# Patient Record
Sex: FEMALE | Race: WHITE | Employment: UNEMPLOYED | URBAN - METROPOLITAN AREA
[De-identification: names, ages, dates, MRNs, and addresses within clinical notes are randomized per-mention and may not be internally consistent; named-entity substitution may affect disease eponyms.]

---

## 2018-01-10 ENCOUNTER — TELEPHONE (OUTPATIENT)
Dept: ENDOCRINOLOGY | Age: 63
End: 2018-01-10

## 2018-01-22 ENCOUNTER — OFFICE VISIT (OUTPATIENT)
Dept: ENDOCRINOLOGY | Age: 63
End: 2018-01-22

## 2018-01-22 VITALS
SYSTOLIC BLOOD PRESSURE: 120 MMHG | HEART RATE: 66 BPM | HEIGHT: 63 IN | WEIGHT: 252 LBS | DIASTOLIC BLOOD PRESSURE: 45 MMHG | BODY MASS INDEX: 44.65 KG/M2

## 2018-01-22 DIAGNOSIS — Z79.4 TYPE 2 DIABETES MELLITUS WITHOUT COMPLICATION, WITH LONG-TERM CURRENT USE OF INSULIN (HCC): Primary | ICD-10-CM

## 2018-01-22 DIAGNOSIS — E11.9 TYPE 2 DIABETES MELLITUS WITHOUT COMPLICATION, WITH LONG-TERM CURRENT USE OF INSULIN (HCC): Primary | ICD-10-CM

## 2018-01-22 DIAGNOSIS — E78.2 MIXED HYPERLIPIDEMIA: ICD-10-CM

## 2018-01-22 DIAGNOSIS — I10 ESSENTIAL HYPERTENSION: ICD-10-CM

## 2018-01-22 PROCEDURE — 99215 OFFICE O/P EST HI 40 MIN: CPT | Performed by: INTERNAL MEDICINE

## 2018-01-22 RX ORDER — TORSEMIDE 20 MG/1
200 TABLET ORAL
COMMUNITY
End: 2020-11-18

## 2018-01-22 RX ORDER — HYDROCODONE BITARTRATE AND ACETAMINOPHEN 5; 325 MG/1; MG/1
5-250 TABLET ORAL EVERY 6 HOURS PRN
COMMUNITY

## 2018-01-22 RX ORDER — CITALOPRAM 20 MG/1
40 TABLET ORAL DAILY
COMMUNITY

## 2018-01-22 RX ORDER — PRAVASTATIN SODIUM 80 MG/1
80 TABLET ORAL
COMMUNITY
Start: 2017-12-19

## 2018-01-22 RX ORDER — ALPRAZOLAM 0.5 MG/1
0.5 TABLET ORAL NIGHTLY PRN
COMMUNITY
End: 2019-08-06

## 2018-01-22 RX ORDER — CLOPIDOGREL BISULFATE 75 MG/1
75 TABLET ORAL DAILY
COMMUNITY
End: 2018-05-07

## 2018-01-22 RX ORDER — GEMFIBROZIL 600 MG/1
600 TABLET, FILM COATED ORAL
COMMUNITY
End: 2018-01-29 | Stop reason: SDUPTHER

## 2018-01-22 RX ORDER — LISINOPRIL 20 MG/1
20 TABLET ORAL DAILY
COMMUNITY
End: 2019-11-19

## 2018-01-22 ASSESSMENT — PATIENT HEALTH QUESTIONNAIRE - PHQ9
SUM OF ALL RESPONSES TO PHQ9 QUESTIONS 1 & 2: 0
SUM OF ALL RESPONSES TO PHQ QUESTIONS 1-9: 0
2. FEELING DOWN, DEPRESSED OR HOPELESS: 0
1. LITTLE INTEREST OR PLEASURE IN DOING THINGS: 0

## 2018-01-22 NOTE — PROGRESS NOTES
5/3/2010    Type II or unspecified type diabetes mellitus without mention of complication, not stated as uncontrolled 5/3/2010    Unspecified essential hypertension 5/3/2010    Unspecified vitamin D deficiency 5/3/2010      Patient Active Problem List   Diagnosis    Vitamin D deficiency    Essential hypertension    Other and unspecified hyperlipidemia    Mononeuritis    Esophageal reflux    Depressive disorder, not elsewhere classified    Benign intracranial hypertension    Fibromyalgia    Diabetes mellitus out of control ([facility] [address]    Proteinuria    Hypercalcemia    Arthritis     Past Surgical History:   Procedure Laterality Date    GYNECOLOGIC CRYOSURGERY      D&C    HYSTERECTOMY, TOTAL ABDOMINAL      SHOULDER SURGERY      WRIST SURGERY       Social History     Social History    Marital status: Single     Spouse name: N/A    Number of children: N/A    Years of education: N/A     Occupational History    Not on file. Social History Main Topics    Smoking status: Never Smoker    Smokeless tobacco: Never Used    Alcohol use No    Drug use: No    Sexual activity: Not on file     Other Topics Concern    Not on file     Social History Narrative    No narrative on file     Family History   Problem Relation Age of Onset   [facility] Cancer Mother     Diabetes Mother     High Blood Pressure Mother     Cancer Father     Diabetes Father     High Blood Pressure Father      Current Outpatient Prescriptions   Medication Sig Dispense Refill    aspirin 81 MG tablet Take 81 mg by mouth      insulin aspart (NOVOLOG) 100 UNIT/ML injection vial Inject 60 Units into the skin       pravastatin (PRAVACHOL) 80 MG tablet Take 80 mg by mouth      lisinopril (PRINIVIL;ZESTRIL) 20 MG tablet Take 20 mg by mouth daily      HYDROcodone-acetaminophen (NORCO) 5-325 MG per tablet Take 5-250 tablets by mouth every 6 hours as needed for Pain.       clopidogrel (PLAVIX) 75 MG tablet Take 75 mg by mouth daily      gemfibrozil (LOPID) 600 MG tablet Take 600 mg by mouth 2 times daily (before meals)      ALPRAZolam (XANAX) 0.5 MG tablet Take 0.5 mg by mouth nightly as needed for Sleep.  citalopram (CELEXA) 20 MG tablet Take 20 mg by mouth daily      insulin detemir (LEVEMIR) 100 UNIT/ML injection vial Inject 95 Units into the skin 2 times daily      torsemide (DEMADEX) 20 MG tablet Take 20-80 mg by mouth daily      Erythromycin 2 % OINT Apply topically 3 times daily      gabapentin (NEURONTIN) 600 MG tablet Take 600 mg by mouth 3 times daily.  pregabalin (LYRICA) 200 MG capsule Take 200 mg by mouth. One tid      Insulin Syringes, Disposable, U-100 0.5 ML MISC by Does not apply route. Use bid or as directed       Blood Glucose Monitoring Suppl (ONE TOUCH TEST STRIP RAGLAND) by Does not apply route. Tests six times a day       zolpidem (AMBIEN) 5 MG tablet Take 5 mg by mouth nightly as needed.  LamoTRIgine (LAMICTAL XR) 200 MG TB24 Take  by mouth. No current facility-administered medications for this visit.       Allergies   Allergen Reactions    Lipitor     Sumatriptan      Family Status   Relation Status    Mother Alive   Kameron Mac Father     Sister Alive       Review of Systems  Constitutional: no fatigue, no fever, no recent weight gain, no recent weight loss, no changes in appetite  Eyes: no eye pain, no change in vision, no eye redness, no eye irritation, no double vision  Ears, nose, throat: no nasal congestion, no sore throat, no earache, no decrease in hearing, no hoarseness, no dry mouth, no sinus problems, no difficulty swallowing, no neck lumps, no dental problems, no mouth sores, no ringing in ears  Pulmonary: no shortness of breath, no wheezing, no dyspnea on exertion, no cough  Cardiovascular: no chest pain, no lower extremity edema, no orthopnea, no intermittent leg claudication, no palpitations  Gastrointestinal: no abdominal pain, no nausea, no vomiting, no diarrhea, no

## 2018-01-22 NOTE — PATIENT INSTRUCTIONS
carbohydrate serving. Count carbs  Counting carbs lets you know how much rapid-acting insulin to take before you eat. If you use an insulin pump, you get a constant rate of insulin during the day. So the pump must be programmed at meals. This gives you extra insulin to cover the rise in blood sugar after meals. If you take insulin:  · Learn your own insulin-to-carb ratio. You and your diabetes health professional will figure out the ratio. You can do this by testing your blood sugar after meals. For example, you may need a certain amount of insulin for every 15 grams of carbs. · Add up the carb grams in a meal. Then you can figure out how many units of insulin to take based on your insulin-to-carb ratio. · Exercise lowers blood sugar. You can use less insulin than you would if you were not doing exercise. Keep in mind that timing matters. If you exercise within 1 hour after a meal, your body may need less insulin for that meal than it would if you exercised 3 hours after the meal. Test your blood sugar to find out how exercise affects your need for insulin. If you do or don't take insulin:  · Look at labels on packaged foods. This can tell you how many carbs are in a serving. You can also use guides from the American Diabetes Association. · Be aware of portions, or serving sizes. If a package has two servings and you eat the whole package, you need to double the number of grams of carbohydrate listed for one serving. · Protein, fat, and fiber do not raise blood sugar as much as carbs do. If you eat a lot of these nutrients in a meal, your blood sugar will rise more slowly than it would otherwise. Eat from all food groups  · Eat at least three meals a day. · Plan meals to include food from all the food groups. The food groups include grains, fruits, dairy, proteins, and vegetables. · Talk to your dietitian or diabetes educator about ways to add limited amounts of sweets into your meal plan.   · If you drink

## 2018-01-29 RX ORDER — GEMFIBROZIL 600 MG/1
600 TABLET, FILM COATED ORAL 2 TIMES DAILY
Qty: 60 TABLET | Refills: 3 | Status: SHIPPED | OUTPATIENT
Start: 2018-01-29 | End: 2018-02-02 | Stop reason: SDUPTHER

## 2018-02-01 ENCOUNTER — TELEPHONE (OUTPATIENT)
Dept: ENDOCRINOLOGY | Age: 63
End: 2018-02-01

## 2018-02-01 NOTE — TELEPHONE ENCOUNTER
Patient called to state that the RX for Gemfibrozil was sent to [facility] on 1/29/18 instead of [facility] [address]. She has not used [facility] for years. Gave message to Bianka Gore.

## 2018-02-02 ENCOUNTER — TELEPHONE (OUTPATIENT)
Dept: ENDOCRINOLOGY | Age: 63
End: 2018-02-02

## 2018-02-02 RX ORDER — GEMFIBROZIL 600 MG/1
600 TABLET, FILM COATED ORAL 2 TIMES DAILY
Qty: 60 TABLET | Refills: 3 | Status: SHIPPED | OUTPATIENT
Start: 2018-02-02 | End: 2018-05-07 | Stop reason: SDUPTHER

## 2018-02-02 NOTE — TELEPHONE ENCOUNTER
Patient called to state that the RX for Gemfibrozil was sent to [facility] on 1/29/18 instead of [facility] [address]. She has not used [facility] for years.

## 2018-02-23 ENCOUNTER — TELEPHONE (OUTPATIENT)
Dept: ENDOCRINOLOGY | Age: 63
End: 2018-02-23

## 2018-04-23 LAB — DIABETIC RETINOPATHY: NORMAL

## 2018-05-04 ENCOUNTER — TELEPHONE (OUTPATIENT)
Dept: ENDOCRINOLOGY | Age: 63
End: 2018-05-04

## 2018-05-04 LAB
ALBUMIN SERPL-MCNC: 4.2 G/DL
ALP BLD-CCNC: 118 U/L
ALT SERPL-CCNC: 9 U/L
ANION GAP SERPL CALCULATED.3IONS-SCNC: 10 MMOL/L
AST SERPL-CCNC: 14 U/L
AVERAGE GLUCOSE: NORMAL
BILIRUB SERPL-MCNC: 0.3 MG/DL (ref 0.1–1.4)
BUN BLDV-MCNC: 45 MG/DL
CALCIUM SERPL-MCNC: 9.3 MG/DL
CHLORIDE BLD-SCNC: 105 MMOL/L
CHOLESTEROL, TOTAL: 189 MG/DL
CHOLESTEROL/HDL RATIO: NORMAL
CO2: 27 MMOL/L
CREAT SERPL-MCNC: 1.34 MG/DL
CREATININE, URINE: 86.6
GFR CALCULATED: 43
GLUCOSE BLD-MCNC: 212 MG/DL
HBA1C MFR BLD: 9 %
HDLC SERPL-MCNC: 39 MG/DL (ref 35–70)
LDL CHOLESTEROL CALCULATED: 88 MG/DL (ref 0–160)
MICROALBUMIN/CREAT 24H UR: 35.7 MG/G{CREAT}
MICROALBUMIN/CREAT UR-RTO: 41.2
POTASSIUM SERPL-SCNC: 5 MMOL/L
SODIUM BLD-SCNC: 142 MMOL/L
TOTAL PROTEIN: 6.7
TRIGL SERPL-MCNC: 311 MG/DL
TSH SERPL DL<=0.05 MIU/L-ACNC: 2.27 UIU/ML
VLDLC SERPL CALC-MCNC: NORMAL MG/DL

## 2018-05-07 ENCOUNTER — OFFICE VISIT (OUTPATIENT)
Dept: ENDOCRINOLOGY | Age: 63
End: 2018-05-07

## 2018-05-07 VITALS
OXYGEN SATURATION: 98 % | BODY MASS INDEX: 44.83 KG/M2 | HEIGHT: 63 IN | DIASTOLIC BLOOD PRESSURE: 68 MMHG | HEART RATE: 64 BPM | SYSTOLIC BLOOD PRESSURE: 128 MMHG | WEIGHT: 253 LBS

## 2018-05-07 DIAGNOSIS — I10 ESSENTIAL HYPERTENSION: ICD-10-CM

## 2018-05-07 DIAGNOSIS — E55.9 VITAMIN D DEFICIENCY: ICD-10-CM

## 2018-05-07 DIAGNOSIS — E11.21 TYPE 2 DIABETES MELLITUS WITH DIABETIC NEPHROPATHY, WITH LONG-TERM CURRENT USE OF INSULIN (HCC): Primary | ICD-10-CM

## 2018-05-07 DIAGNOSIS — D75.0 ESSENTIAL HYPERERYTHROPOIETINEMIA: ICD-10-CM

## 2018-05-07 DIAGNOSIS — I25.83 CORONARY ARTERY DISEASE DUE TO LIPID RICH PLAQUE: ICD-10-CM

## 2018-05-07 DIAGNOSIS — Z79.4 TYPE 2 DIABETES MELLITUS WITH DIABETIC NEPHROPATHY, WITH LONG-TERM CURRENT USE OF INSULIN (HCC): Primary | ICD-10-CM

## 2018-05-07 DIAGNOSIS — I25.10 CORONARY ARTERY DISEASE DUE TO LIPID RICH PLAQUE: ICD-10-CM

## 2018-05-07 PROCEDURE — 99214 OFFICE O/P EST MOD 30 MIN: CPT | Performed by: INTERNAL MEDICINE

## 2018-05-07 RX ORDER — GEMFIBROZIL 600 MG/1
600 TABLET, FILM COATED ORAL 2 TIMES DAILY
Qty: 180 TABLET | Refills: 1 | Status: SHIPPED | OUTPATIENT
Start: 2018-05-07 | End: 2019-08-21 | Stop reason: SDUPTHER

## 2018-05-07 ASSESSMENT — PATIENT HEALTH QUESTIONNAIRE - PHQ9
1. LITTLE INTEREST OR PLEASURE IN DOING THINGS: 0
SUM OF ALL RESPONSES TO PHQ QUESTIONS 1-9: 0
2. FEELING DOWN, DEPRESSED OR HOPELESS: 0
SUM OF ALL RESPONSES TO PHQ9 QUESTIONS 1 & 2: 0

## 2018-05-24 LAB
VITAMIN D 25-HYDROXY: 31.3
VITAMIN D2, 25 HYDROXY: NORMAL
VITAMIN D3,25 HYDROXY: NORMAL

## 2018-06-01 ENCOUNTER — TELEPHONE (OUTPATIENT)
Dept: ENDOCRINOLOGY | Age: 63
End: 2018-06-01

## 2018-06-11 LAB
ALBUMIN SERPL-MCNC: NORMAL G/DL
ALP BLD-CCNC: NORMAL U/L
ALT SERPL-CCNC: NORMAL U/L
ANION GAP SERPL CALCULATED.3IONS-SCNC: 9 MMOL/L
AST SERPL-CCNC: NORMAL U/L
BILIRUB SERPL-MCNC: NORMAL MG/DL (ref 0.1–1.4)
BUN BLDV-MCNC: 39 MG/DL
CALCIUM SERPL-MCNC: 9.5 MG/DL
CHLORIDE BLD-SCNC: 103 MMOL/L
CO2: 28 MMOL/L
CREAT SERPL-MCNC: 1.11 MG/DL
GFR CALCULATED: 54
GLUCOSE BLD-MCNC: 314 MG/DL
POTASSIUM SERPL-SCNC: 4.9 MMOL/L
SODIUM BLD-SCNC: 140 MMOL/L
TOTAL PROTEIN: NORMAL

## 2018-08-01 ENCOUNTER — TELEPHONE (OUTPATIENT)
Dept: ENDOCRINOLOGY | Age: 63
End: 2018-08-01

## 2018-08-13 ENCOUNTER — OFFICE VISIT (OUTPATIENT)
Dept: ENDOCRINOLOGY | Age: 63
End: 2018-08-13

## 2018-08-13 ENCOUNTER — TELEPHONE (OUTPATIENT)
Dept: ENDOCRINOLOGY | Age: 63
End: 2018-08-13

## 2018-08-13 VITALS
SYSTOLIC BLOOD PRESSURE: 136 MMHG | HEIGHT: 63 IN | WEIGHT: 252 LBS | DIASTOLIC BLOOD PRESSURE: 65 MMHG | HEART RATE: 66 BPM | BODY MASS INDEX: 44.65 KG/M2

## 2018-08-13 DIAGNOSIS — E11.22 TYPE 2 DIABETES MELLITUS WITH STAGE 1 CHRONIC KIDNEY DISEASE, UNSPECIFIED WHETHER LONG TERM INSULIN USE (HCC): Primary | ICD-10-CM

## 2018-08-13 DIAGNOSIS — E83.52 HYPERCALCEMIA: ICD-10-CM

## 2018-08-13 DIAGNOSIS — Z78.0 POSTMENOPAUSAL: ICD-10-CM

## 2018-08-13 DIAGNOSIS — N18.1 TYPE 2 DIABETES MELLITUS WITH STAGE 1 CHRONIC KIDNEY DISEASE, UNSPECIFIED WHETHER LONG TERM INSULIN USE (HCC): Primary | ICD-10-CM

## 2018-08-13 DIAGNOSIS — I10 ESSENTIAL HYPERTENSION: ICD-10-CM

## 2018-08-13 PROCEDURE — 3017F COLORECTAL CA SCREEN DOC REV: CPT | Performed by: INTERNAL MEDICINE

## 2018-08-13 PROCEDURE — 3045F PR MOST RECENT HEMOGLOBIN A1C LEVEL 7.0-9.0%: CPT | Performed by: INTERNAL MEDICINE

## 2018-08-13 PROCEDURE — G8417 CALC BMI ABV UP PARAM F/U: HCPCS | Performed by: INTERNAL MEDICINE

## 2018-08-13 PROCEDURE — 1036F TOBACCO NON-USER: CPT | Performed by: INTERNAL MEDICINE

## 2018-08-13 PROCEDURE — 2022F DILAT RTA XM EVC RTNOPTHY: CPT | Performed by: INTERNAL MEDICINE

## 2018-08-13 PROCEDURE — G8598 ASA/ANTIPLAT THER USED: HCPCS | Performed by: INTERNAL MEDICINE

## 2018-08-13 PROCEDURE — 99214 OFFICE O/P EST MOD 30 MIN: CPT | Performed by: INTERNAL MEDICINE

## 2018-08-13 PROCEDURE — G8427 DOCREV CUR MEDS BY ELIG CLIN: HCPCS | Performed by: INTERNAL MEDICINE

## 2018-08-13 RX ORDER — FLASH GLUCOSE SENSOR
KIT MISCELLANEOUS
Qty: 3 EACH | Refills: 3 | Status: SHIPPED | OUTPATIENT
Start: 2018-08-13 | End: 2020-11-18

## 2018-08-13 RX ORDER — FLASH GLUCOSE SENSOR
KIT MISCELLANEOUS
Qty: 1 DEVICE | Refills: 2 | Status: SHIPPED | OUTPATIENT
Start: 2018-08-13 | End: 2019-01-22

## 2018-08-13 NOTE — PROGRESS NOTES
and/or ordered radiology tests Yes  Reviewed and/or ordered other diagnostic tests Yes  Made a decision to obtain old records Yes  Reviewed and summarized old records Yes      Moise Valencia was counseled regarding symptoms of current diagnosis, course and complications of disease if inadequately treated, side effects of medications, diagnosis, treatment options, and prognosis, risks, benefits, complications, and alternatives of treatment, labs, imaging and other studies and treatment targets and goals. She understands instructions and counseling. These diagnosis were discussed and reviewed with the patient including the advantages of drug therapy. She was counseled at this visit on the following: diabetes complication prevention and foot care. Return in about 3 months (around 11/13/2018).

## 2018-08-24 NOTE — TELEPHONE ENCOUNTER
Prior authorization for Jardiance sent through CoverMyMeds to patient insurance plan on 8/24/18. Faxed clinicals.

## 2018-08-27 NOTE — TELEPHONE ENCOUNTER
Fax from [facility] denial for Jardiance not on formulary.  There are other drugs on formulary to treat condition

## 2018-11-03 LAB
AVERAGE GLUCOSE: NORMAL
HBA1C MFR BLD: 11 %

## 2018-11-15 LAB
BUN BLDV-MCNC: 33 MG/DL
CALCIUM SERPL-MCNC: 9.5 MG/DL
CHLORIDE BLD-SCNC: 104 MMOL/L
CO2: 33 MMOL/L
CREAT SERPL-MCNC: 1.06 MG/DL
GFR CALCULATED: 56
GLUCOSE BLD-MCNC: 165 MG/DL
POTASSIUM SERPL-SCNC: 4.3 MMOL/L
SODIUM BLD-SCNC: 143 MMOL/L

## 2018-11-26 ENCOUNTER — TELEPHONE (OUTPATIENT)
Dept: ENDOCRINOLOGY | Age: 63
End: 2018-11-26

## 2018-11-26 DIAGNOSIS — R06.02 SOB (SHORTNESS OF BREATH): ICD-10-CM

## 2018-11-26 NOTE — TELEPHONE ENCOUNTER
I have reviewed her logs and she needs to be on a better sliding scale     Continue Aspart insulin 20 units with each meal plus use this sliding scale     Sliding Scale Insulin for short acting insulin   If BS > 120 -150 take 2 additional units of fast actinginsulin   if --180 take 4 units   181-210 take 6 Units  211-240 take 8 Units   241--270 take 10 Units   271- 300 take 12 Units

## 2018-11-27 NOTE — TELEPHONE ENCOUNTER
Fax from [facility] her BS this a.m @ 9am was [phone]. Her sliding scale goes up to 300 w/ 12 units. 12 units given.  Would like extra coverage and could they get orders for sliding scale that goes up to 400 per letter faxed and scanned

## 2018-11-28 NOTE — TELEPHONE ENCOUNTER
Nurse Brad Anderson is aware of dosing change and new sliding scale, faxed instruction to facility. She will call back with any questions.

## 2018-11-28 NOTE — TELEPHONE ENCOUNTER
Spoke with [facility] she said sliding scale Dr. Aurora Clay wrote is very similar to a sliding scale they were following previously only it goes up to 450. Old sliding scale scanned into chart.  Nurse would like to know if you want to change her sliding scale     11/27  10PM 305    11/28    10:30

## 2018-12-04 NOTE — TELEPHONE ENCOUNTER
We can give her 22 units of novolog   Please advise to restrict snacks with carbohydrates   Send log to me

## 2018-12-05 ENCOUNTER — TELEPHONE (OUTPATIENT)
Dept: ENDOCRINOLOGY | Age: 63
End: 2018-12-05

## 2019-01-22 ENCOUNTER — OFFICE VISIT (OUTPATIENT)
Dept: ENDOCRINOLOGY | Age: 64
End: 2019-01-22
Payer: MEDICARE

## 2019-01-22 VITALS
HEART RATE: 73 BPM | HEIGHT: 63 IN | SYSTOLIC BLOOD PRESSURE: 130 MMHG | DIASTOLIC BLOOD PRESSURE: 66 MMHG | OXYGEN SATURATION: 93 % | WEIGHT: 266.4 LBS | RESPIRATION RATE: 22 BRPM | BODY MASS INDEX: 47.2 KG/M2

## 2019-01-22 DIAGNOSIS — I10 ESSENTIAL HYPERTENSION: ICD-10-CM

## 2019-01-22 DIAGNOSIS — E78.2 MIXED HYPERLIPIDEMIA: ICD-10-CM

## 2019-01-22 DIAGNOSIS — E66.01 CLASS 3 SEVERE OBESITY DUE TO EXCESS CALORIES WITH SERIOUS COMORBIDITY AND BODY MASS INDEX (BMI) OF 45.0 TO 49.9 IN ADULT (HCC): ICD-10-CM

## 2019-01-22 PROBLEM — E66.813 CLASS 3 SEVERE OBESITY DUE TO EXCESS CALORIES WITH SERIOUS COMORBIDITY AND BODY MASS INDEX (BMI) OF 45.0 TO 49.9 IN ADULT: Status: ACTIVE | Noted: 2019-01-22

## 2019-01-22 PROCEDURE — G8417 CALC BMI ABV UP PARAM F/U: HCPCS | Performed by: INTERNAL MEDICINE

## 2019-01-22 PROCEDURE — G8427 DOCREV CUR MEDS BY ELIG CLIN: HCPCS | Performed by: INTERNAL MEDICINE

## 2019-01-22 PROCEDURE — 99215 OFFICE O/P EST HI 40 MIN: CPT | Performed by: INTERNAL MEDICINE

## 2019-01-22 PROCEDURE — 3046F HEMOGLOBIN A1C LEVEL >9.0%: CPT | Performed by: INTERNAL MEDICINE

## 2019-01-22 PROCEDURE — 2022F DILAT RTA XM EVC RTNOPTHY: CPT | Performed by: INTERNAL MEDICINE

## 2019-01-22 PROCEDURE — 3017F COLORECTAL CA SCREEN DOC REV: CPT | Performed by: INTERNAL MEDICINE

## 2019-01-22 PROCEDURE — 1036F TOBACCO NON-USER: CPT | Performed by: INTERNAL MEDICINE

## 2019-01-22 PROCEDURE — G8484 FLU IMMUNIZE NO ADMIN: HCPCS | Performed by: INTERNAL MEDICINE

## 2019-01-22 RX ORDER — DIAZEPAM 5 MG/1
5 TABLET ORAL 2 TIMES DAILY PRN
COMMUNITY

## 2019-01-22 RX ORDER — DOXEPIN HYDROCHLORIDE 50 MG/1
100 CAPSULE ORAL NIGHTLY
COMMUNITY

## 2019-05-02 ENCOUNTER — OFFICE VISIT (OUTPATIENT)
Dept: ENDOCRINOLOGY | Age: 64
End: 2019-05-02
Payer: MEDICARE

## 2019-05-02 VITALS
HEART RATE: 92 BPM | DIASTOLIC BLOOD PRESSURE: 66 MMHG | WEIGHT: 270 LBS | SYSTOLIC BLOOD PRESSURE: 128 MMHG | BODY MASS INDEX: 47.84 KG/M2 | OXYGEN SATURATION: 95 % | HEIGHT: 63 IN

## 2019-05-02 DIAGNOSIS — E11.9 CONTROLLED TYPE 2 DIABETES MELLITUS WITHOUT COMPLICATION, WITH LONG-TERM CURRENT USE OF INSULIN (HCC): Primary | ICD-10-CM

## 2019-05-02 DIAGNOSIS — Z79.4 CONTROLLED TYPE 2 DIABETES MELLITUS WITHOUT COMPLICATION, WITH LONG-TERM CURRENT USE OF INSULIN (HCC): Primary | ICD-10-CM

## 2019-05-02 PROCEDURE — 99214 OFFICE O/P EST MOD 30 MIN: CPT | Performed by: INTERNAL MEDICINE

## 2019-05-02 PROCEDURE — 3017F COLORECTAL CA SCREEN DOC REV: CPT | Performed by: INTERNAL MEDICINE

## 2019-05-02 PROCEDURE — G8427 DOCREV CUR MEDS BY ELIG CLIN: HCPCS | Performed by: INTERNAL MEDICINE

## 2019-05-02 PROCEDURE — 1036F TOBACCO NON-USER: CPT | Performed by: INTERNAL MEDICINE

## 2019-05-02 PROCEDURE — 3046F HEMOGLOBIN A1C LEVEL >9.0%: CPT | Performed by: INTERNAL MEDICINE

## 2019-05-02 PROCEDURE — 2022F DILAT RTA XM EVC RTNOPTHY: CPT | Performed by: INTERNAL MEDICINE

## 2019-05-02 PROCEDURE — G8417 CALC BMI ABV UP PARAM F/U: HCPCS | Performed by: INTERNAL MEDICINE

## 2019-05-02 RX ORDER — GLIPIZIDE 5 MG/1
5 TABLET, FILM COATED, EXTENDED RELEASE ORAL DAILY
Qty: 30 TABLET | Refills: 3 | Status: SHIPPED | OUTPATIENT
Start: 2019-05-02 | End: 2019-08-06 | Stop reason: SDUPTHER

## 2019-05-02 NOTE — PROGRESS NOTES
Dominic Carson is a 59 y.o. female  seen for the management of uncontrolled Type 2 diabetes, hyperlipidemia and obesity  . Pt has T2DM which is uncontrolled and is complicated by severe peripheral neuropathy ,retinopathy and nephropathy. Pt was diagnosed in year 1995 when she was being evaluated for hypertriglyceridemia GEORGE has been very noncompliant she admits that her depression contributes to her noncompliance   James Churchill has been obese most of her adult life  and she doesn't follow any dietry restriction. -- she has CAD s/p  stents 2016  And has CHF as well follows with Dr Rosanne Hodge   ---She was in the [facility] nov [facility] for pneumonia and she was sent to rehab   Her fingerstick blood glucose  improved when  compliance improved in oct/nov 2017   Finally she has started taking insulin more often and started checking her numbers and some of her fingerstick blood glucose were in the 100 range but she went back to her noncompliance after a few months of better control       INTERIM:    Diabetes   She presents for her follow-up diabetic visit. She has type 2 diabetes mellitus. No MedicAlert identification noted. The initial diagnosis of diabetes was made 23 years ago. Her disease course has been stable. Hypoglycemia symptoms include confusion, nervousness/anxiousness, sweats and tremors. Associated symptoms include weakness and weight loss. Pertinent negatives for diabetes include no chest pain and no fatigue. There are no hypoglycemic complications. Symptoms are stable. Diabetic complications include a CVA, heart disease, peripheral neuropathy and retinopathy. Risk factors for coronary artery disease include diabetes mellitus, dyslipidemia, family history and hypertension. She is compliant with treatment most of the time. Her weight is decreasing steadily. She is following a diabetic and low salt diet. Meal planning includes avoidance of concentrated sweets and carbohydrate counting.  She has had a previous visit with a dietitian. She rarely participates in exercise. Her home blood glucose trend is decreasing steadily. Her breakfast blood glucose is taken between 8-9 am. Her breakfast blood glucose range is generally 130-140 mg/dl. Her lunch blood glucose is taken between 12-1 pm. Her lunch blood glucose range is generally 130-140 mg/dl. Her dinner blood glucose is taken between 6-7 pm. Her dinner blood glucose range is generally 140-180 mg/dl. An ACE inhibitor/angiotensin II receptor blocker is being taken. She sees a podiatrist.Eye exam is current. She did very well for a few months at the end of 2017 and beginning of 2018 and now is back again to  not checking her fingerstick blood glucose and not taking her rx insulin   Denies any hypoglycemia     Weight trend: stable  Prior visit with dietician: yes  Current diet: on average, 3 meals per day  Current exercise: rare    Has there been any hospitalization, surgery or major illness since the last visit? No   Has there been any new school, family or social problems since the last visit? No  Has there been any new family history of members with diabetes, heart disease, stroke, or endocrine related problems since the last visit?   No    Past Medical History:   Diagnosis Date    Abdominal pain, right upper quadrant [id number]    Abnormal weight gain 5/3/2010    Bell's palsy [id number]    Benign intracranial hypertension 5/3/2010    Class 3 severe obesity due to excess calories with serious comorbidity and body mass index (BMI) of 45.0 to 49.9 in adult [facility]) 1/22/2019    Depressive disorder, not elsewhere classified 5/3/2010    Dizziness and giddiness     Edema     Esophageal reflux 5/3/2010    Fibromyalgia     Gastroparesis     Lumbago     Mononeuritis of unspecified site 5/3/2010    Onychia and paronychia of toe [id number]    Other and unspecified hyperlipidemia 5/3/2010    Other nonspecific abnormal serum enzyme levels 5/3/2010    Other nonspecific abnormal serum enzyme levels [id number]    Pain in joint, pelvic region and thigh [id number]    Type II or unspecified type diabetes mellitus with neurological manifestations, not stated as uncontrolled(250.60) 5/3/2010    Type II or unspecified type diabetes mellitus without mention of complication, not stated as uncontrolled 5/3/2010    Unspecified essential hypertension 5/3/2010    Unspecified vitamin D deficiency 5/3/2010      Patient Active Problem List   Diagnosis    Vitamin D deficiency    Essential hypertension    Other and unspecified hyperlipidemia    Mononeuritis    Esophageal reflux    Depressive disorder, not elsewhere classified    Benign intracranial hypertension    Fibromyalgia    Diabetes mellitus out of control ([facility] [address]    Proteinuria    Hypercalcemia    Arthritis    Class 3 severe obesity due to excess calories with serious comorbidity and body mass index (BMI) of 45.0 to 49.9 in adult [facility])     Past Surgical History:   Procedure Laterality Date    CATARACT REMOVAL Bilateral 2018    GYNECOLOGIC CRYOSURGERY      D&C    HYSTERECTOMY, TOTAL ABDOMINAL      SHOULDER SURGERY      WRIST SURGERY       Social History     Socioeconomic History    Marital status: Single     Spouse name: Not on file    Number of children: Not on file    Years of education: Not on file    Highest education level: Not on file   Occupational History    Not on file   Social Needs    Financial resource strain: Not on file    Food insecurity:     Worry: Not on file     Inability: Not on file    Transportation needs:     Medical: Not on file     Non-medical: Not on file   Tobacco Use    Smoking status: Never Smoker    Smokeless tobacco: Never Used   Substance and Sexual Activity    Alcohol use: No    Drug use: No    Sexual activity: Not on file   Lifestyle    Physical activity:     Days per week: Not on file     Minutes per session: Not on file    Stress: Not on file   Relationships    Social connections:     Talks on phone: Not on file     Gets together: Not on file     Attends [employer] service: Not on file     Active member of club or organization: Not on file     Attends meetings of clubs or organizations: Not on file     Relationship status: Not on file    Intimate partner violence:     Fear of current or ex partner: Not on file     Emotionally abused: Not on file     Physically abused: Not on file     Forced sexual activity: Not on file   Other Topics Concern    Not on file   Social History Narrative    Not on file     Family History   Problem Relation Age of Onset    Cancer Mother     Diabetes Mother     High Blood Pressure Mother     Cancer Father     Diabetes Father     High Blood Pressure Father      Current Outpatient Medications   Medication Sig Dispense Refill    diazepam (VALIUM) 5 MG tablet Take 5 mg by mouth 2 times daily as needed for Anxiety. Nile Dark doxepin (SINEQUAN) 50 MG capsule Take 100 mg by mouth nightly      Continuous Blood Gluc Sensor ([address] [place] [address]) MISC To be changed every 10 days to monitor glucose 3 each 3    gemfibrozil (LOPID) 600 MG tablet Take 1 tablet by mouth 2 times daily 180 tablet 1    aspirin 81 MG tablet Take 81 mg by mouth      insulin aspart (NOVOLOG) 100 UNIT/ML injection vial Inject 60 Units into the skin       pravastatin (PRAVACHOL) 80 MG tablet Take 80 mg by mouth      lisinopril (PRINIVIL;ZESTRIL) 20 MG tablet Take 20 mg by mouth daily      HYDROcodone-acetaminophen (NORCO) 5-325 MG per tablet Take 5-250 tablets by mouth every 6 hours as needed for Pain.  ALPRAZolam (XANAX) 0.5 MG tablet Take 0.5 mg by mouth nightly as needed for Sleep.       citalopram (CELEXA) 20 MG tablet Take 40 mg by mouth daily       insulin detemir (LEVEMIR) 100 UNIT/ML injection vial Inject 95 Units into the skin 2 times daily      torsemide (DEMADEX) 20 MG tablet Take 100 mg by mouth every other day       Erythromycin 2 % OINT Apply topically 3 times daily      gabapentin (NEURONTIN) 600 MG tablet Take 600 mg by mouth 3 times daily.  pregabalin (LYRICA) 200 MG capsule Take 200 mg by mouth. One tid      Insulin Syringes, Disposable, U-100 0.5 ML MISC by Does not apply route. Use bid or as directed       Blood Glucose Monitoring Suppl (ONE TOUCH TEST STRIP RAGLAND) by Does not apply route. Tests six times a day       LamoTRIgine (LAMICTAL XR) 200 MG TB24 Take 200 mg by mouth daily       dapagliflozin (FARXIGA) 10 MG tablet Take 1 tablet by mouth every morning 30 tablet 3     No current facility-administered medications for this visit.       Allergies   Allergen Reactions    Lipitor     Sumatriptan      Family Status   Relation Name Status    Mother  Alive   Pau Alvarez Father      Sister  Alive       Review of Systems  Constitutional: no fatigue, no fever, no recent weight gain, no recent weight loss, no changes in appetite  Eyes: no eye pain, no change in vision, no eye redness, no eye irritation, no double vision  Ears, nose, throat: no nasal congestion, no sore throat, no earache, no decrease in hearing, no hoarseness, no dry mouth, no sinus problems, no difficulty swallowing, no neck lumps, no dental problems, no mouth sores, no ringing in ears  Pulmonary: no shortness of breath, no wheezing, no dyspnea on exertion, no cough  Cardiovascular: no chest pain, no lower extremity edema, no orthopnea, no intermittent leg claudication, no palpitations  Gastrointestinal: no abdominal pain, no nausea, no vomiting, no diarrhea, no constipation, no dysphagia, no heartburn, no bloating  Genitourinary: no dysuria, no urinary incontinence, no urinary hesitancy, no urinary frequency, no feelings of urinary urgency, no nocturia  Musculoskeletal: no joint swelling, no joint stiffness, no joint pain, no muscle cramps, no muscle pain, no bone pain, no fractures  Integument/Breast: hair loss, but no skin rashes, no skin lesions, no itching, no dry skin, no breast pain, no breast mass, no skin hives, no skin discoloration, no nipple discharge  Neurological: no numbness, no tingling, no weakness, no confusion, no headaches, no dizziness, no fainting, no tremors, no decrease in memory, no balance problems  Psychiatric: no anxiety, no depression, no insomnia, no change in personality, no emotional problems, no stress  Hematologic/Lymphatic: no tendency for easy bleeding, no swollen lymph nodes, no tendency for easy bruising  Immunology: no seasonal allergies, no frequent infections, no frequent illnesses  Endocrine: no temperature intolerance no hirsutism, no hot flashes    OBJECTIVE:  /66   Pulse 92   Ht 5' 3\" (1.6 m)   Wt 270 lb (122.5 kg)   SpO2 95%   BMI 47.83 kg/m²    Wt Readings from Last 3 Encounters:   05/02/19 270 lb (122.5 kg)   01/22/19 266 lb 6.4 oz (120.8 kg)   08/13/18 252 lb (114.3 kg)     Constitutional: no acute distress, well appearing and well nourished  Psychiatric: oriented to person, place and time, judgement and insight and normal, recent and remote memory and intact and mood and affect are normal  Skin: skin and subcutaneous tissue is normal without mass, normal turgor  Head and Face: examination of head and face revealed no abnormalities  Eyes: no lid or conjunctival swelling, erythema or discharge  Ears/Nose: external inspection of ears and nose revealed no abnormalities, hearing is grossly normal  Oropharynx/Mouth/Face: lips, tongue and gums are normal with no lesions, the voice quality was normal  Neck: neck is supple and symmetric, with midline trachea and no masses, thyroid is normal  Pulmonary: no increased work of breathing or signs of respiratory distress, lungs are clear to auscultation  Cardiovascular: normal heart rate and rhythm, normal S1 and S2, no murmurs and pedal pulses and 2+ bilaterally, No edema  Musculoskeletal: normal gait and station and exam of the digits and nails are normal  Neurological: normal coordination and normal general cortical function        Lab Results   Component Value Date    LABA1C 11.0 11/03/2018    LABA1C 9.0 05/04/2018    LABA1C 9.7 03/18/2011    LABA1C 11.0 12/31/2010         ASSESSMENT/PLAN:  --- Type 2 diabetes mellitus with complication, with long-term current use of insulin (HCC)---10.5>>11.1>>9.7>>8.5>>10.5>>11.0 >>11.3 >>9.5 >>9.4 >>10.0>>11.3>>10.3>>10.8>>10.4>>9.9>>11.9>>10.8>>9.9 >>8.5>>7.2>>9.1>>9.9>>11.3    Due to noncompliance her A1c is at  11   I had a lengthy discussion with the patient in regards to her uncontrolled diabetes . In the end, we decided to switch back to oral pills in order to improve  compliance   --start Glipizide 5 mg daily  --synjardy ( pt couldn't tolerate SE stopped it )   She  was advised to get a chem 7 drawn a week  After starting SGLT-2 inhib   ---GEORGE was taking 55 units BID of Levemir and almost 30 units of NovologIn the beginning of 2018 and her control had improved significantly to an A1c of 7.2 ---she quit checking her fingerstick blood glucose      She checks her fingerstick glucose 4-5 times daily  Discussed getting Dexcom to improve glycemia     she had diabetic retinopthay and ended up getting laser treatment she is s/p laser ---last visit august 2018 stable   -last microalb/creat ratio was 97.7>> 43 jan 2017 has nephropathy aware of that   -foot exam peripheral neuropathy follows with a neurologist and is on lyrica and gabapentin.& __ left foot hammer toe infection healed after tendon release in march 2018   Follows with podiatry every 3 months     - she has one [address] level which was not normal so I ordred 24 hr urine cortisol and 1 mg dex supression which pt never got done ---  Will try to get it done now       2. Essential hypertension and congestive heart failure  Stable follows with Dr Angelina Carroll       3.  Mixed hyperlipidemia  HYPERTRIGLYCERIDEMIA level was 621>. 231 >>441>>336>>1089>>300 >>196  High pancreatitis risk I gave her printed hanout very low fat diet as well starting Lopid with crestor ---Sideeffects are more with combo with statins so she is asked to have repeat labs in a month   Advised to take insulin as well as started gemfibrozil ---pravachol 40      4. Class 3 obesity due to excess calories with serious comorbidity in adult, unspecified BMI  She has been gaining weight     CAD s/p angioplasty   Follows with cardiology   Did cardiac rehab   Heart cath in oct 2017     Vit D def level was 14.3 improved to mid 26 >>23.4  Doesn't want to take it take it     DEXA scan 2015 was normal   Repeat orders given     PSYCH ISSUES /BIPOLAR DISORDER   on remeron   Follows with therapist       MNG   largest nodule in rt lobe 9 mm ---stable last  imaging in 2017 stable shows MNG   She was given orders for repeat imaging which were not done     HX OF HYPOTHYROIDISM   -she is noted to be euthyroid without taking her LT4 for years       Reviewed and/or ordered clinical lab results Yes  Reviewed and/or ordered radiology tests Yes  Reviewed and/or ordered other diagnostic tests Yes  Made a decision to obtain old records Yes  Reviewed and summarized old records Yes      Pawel Johnson was counseled regarding symptoms of current diagnosis, course and complications of disease if inadequately treated, side effects of medications, diagnosis, treatment options, and prognosis, risks, benefits, complications, and alternatives of treatment, labs, imaging and other studies and treatment targets and goals. She understands instructions and counseling. These diagnosis were discussed and reviewed with the patient including the advantages of drug therapy. She was counseled at this visit on the following: diabetes complication prevention and foot care. No follow-ups on file.

## 2019-05-02 NOTE — PATIENT INSTRUCTIONS
[facility]  Patient Education      Patient Education        empagliflozin  Pronunciation:  EM pa gli FLOE zin  Brand:  Rahul Amaya  What is the most important information I should know about empagliflozin? You should not use empagliflozin if you have severe kidney disease, or if you are on dialysis. Taking empagliflozin can make you dehydrated, which could cause you to feel weak or dizzy (especially when you stand up). Empagliflozin can cause serious infections in the penis or vagina. Get medical help right away if you have burning, itching, odor, discharge, pain, tenderness, redness or swelling of the genital or rectal area, fever, or if you don't feel well. What is empagliflozin? Empagliflozin is an oral diabetes medicine that helps control blood sugar levels. Empagliflozin works by helping the kidneys get rid of glucose from your bloodstream.  Empagliflozin is used together with diet and exercise to improve blood sugar control in adults with type 2 diabetes mellitus. Empagliflozin is also used to lower the risk of death from heart attack, stroke, or heart failure in adults with type 2 diabetes who also have heart disease. Empagliflozin is not for treating type 1 diabetes. Empagliflozin may also be used for purposes not listed in this medication guide. What should I discuss with my healthcare provider before taking empagliflozin? You should not use empagliflozin if you are allergic to it, or if you have:  · severe kidney disease (or if you are on dialysis). Tell your doctor if you have ever had:  · liver or kidney disease;  · bladder infections or other urination problems;  · low blood pressure;  · heart problems;  · problems with your pancreas, including surgery;  · if you drink alcohol often; or  · if you are on a low salt diet. Follow your doctor's instructions about using this medicine if you are pregnant.  Blood sugar control is very important during pregnancy, and your dose needs may be different during each trimester. You should not use empagliflozin during the second or third trimester of pregnancy. You should not breast-feed while using this medicine. Empagliflozin is not approved for use by anyone younger than 25years old. How should I take empagliflozin? Follow all directions on your prescription label and read all medication guides or instruction sheets. Your doctor may occasionally change your dose. Use the medicine exactly as directed. You may take empagliflozin with or without food. Call your doctor if you are sick with vomiting or diarrhea, if you consume less food or fluid than usual, or if you are sweating more than usual.  Your blood sugar will need to be checked often, and you may also need to test the level of ketones your urine. Empagliflozin can cause life-threatening ketoacidosis (too much acid in the blood). Even if your blood sugar is normal, contact your doctor if a urine test shows that you have ketones in the urine. Low blood sugar (hypoglycemia) can happen to everyone who has diabetes. Symptoms include headache, hunger, sweating, irritability, dizziness, nausea, fast heart rate, and feeling anxious or shaky. To quickly treat low blood sugar, always keep a fast-acting source of sugar with you such as fruit juice, hard candy, crackers, raisins, or non-diet soda. Your doctor can prescribe a glucagon emergency injection kit to use in case you have severe hypoglycemia and cannot eat or drink. Be sure your family and close friends know how to give you this injection in an emergency. Also watch for signs of high blood sugar (hyperglycemia) such as increased thirst or urination, blurred vision, headache, and tiredness. Blood sugar levels can be affected by stress, illness, surgery, exercise, alcohol use, or skipping meals. Ask your doctor before changing your dose or medication schedule. This medicine can affect the results of certain medical tests.  Tell any doctor who treats you possible uses, directions, precautions, warnings, drug interactions, allergic reactions, or adverse effects. If you have questions about the drugs you are taking, check with your doctor, nurse or pharmacist.  Copyright [id number] [address]. Version: 3.01. Revision date: 8/30/2018. Care instructions adapted under license by [facility] ([facility]). If you have questions about a medical condition or this instruction, always ask your healthcare professional. [place] [address] any warranty or liability for your use of this information. dulaglutide  Pronunciation:  DOO la GLOO tide  Brand:  Trulicity Pen  What is the most important information I should know about dulaglutide? You should not use dulaglutide if you have Multiple Endocrine Neoplasia syndrome type 2 (MEN 2), or a personal or family history of medullary thyroid carcinoma (a type of thyroid cancer). Do not use dulaglutide if you are in a state of diabetic ketoacidosis (call your doctor for treatment with insulin). In animal studies, dulaglutide caused thyroid tumors or thyroid cancer. It is not known whether these effects would occur in people using regular doses. Ask your doctor about your risk. Call your doctor at once if you have signs of a thyroid tumor, such as swelling or a lump in your neck, trouble swallowing, a hoarse voice, or shortness of breath. What is dulaglutide? Dulaglutide is an injectable diabetes medicine that helps control blood sugar levels. Dulaglutide is used together with diet and exercise to improve blood sugar control in adults with type 2 diabetes mellitus. Dulaglutide is usually given after other diabetes medicines have been tried without success. This medicine is not for treating type 1 diabetes. Dulaglutide may also be used for purposes not listed in this medication guide. What should I discuss with my healthcare provider before using dulaglutide?   You should not use dulaglutide if you are allergic to it, or if you have:  · multiple endocrine neoplasia type 2 (tumors in your glands);  · a personal or family history of medullary thyroid carcinoma (a type of thyroid cancer); or  · diabetic ketoacidosis (call your doctor for treatment with insulin). To make sure dulaglutide is safe for you, tell your doctor if you have:  · a history of pancreatitis;  · a stomach or intestinal disorder (especially if you also have kidney disease);  · gastroesophageal reflux disease (GERD) or slow digestion;  · liver or kidney disease;  · if you also use insulin or oral diabetes medicine; or  · if you have been sick with vomiting or diarrhea. In animal studies, dulaglutide caused thyroid tumors or thyroid cancer. It is not known whether these effects would occur in people using regular doses. Ask your doctor about your risk. It is not known whether this medicine will harm an unborn baby. Tell your doctor if you are pregnant or plan to become pregnant. It is not known whether dulaglutide passes into breast milk or if it could harm a nursing baby. Tell your doctor if you are breast-feeding a baby. Dulaglutide is not approved for use by anyone younger than 25years old. How should I use dulaglutide? Follow all directions on your prescription label. Do not use this medicine in larger or smaller amounts or for longer than recommended. Dulaglutide comes with patient instructions for safe and effective use. Follow these directions carefully. Ask your doctor or pharmacist if you have any questions. Dulaglutide is injected under the skin. You will be shown how to use injections at home. Do not self-inject this medicine if you do not understand how to give the injection and properly dispose of used needles and syringes. You may use dulaglutide with or without food. Dulaglutide is usually given only one time per week. Use the medicine on the same day each week at the same time of day.  If needed, you may change your dosing day, but expiration date on the medicine label. You may also store injection pens or prefilled syringes at room temperature for up to 14 days before use. Do not freeze dulaglutide, and throw away the medicine if it has become frozen. What happens if I miss a dose? If your next dose is 3 or more days away, use the missed dose as soon as you remember. Skip the missed dose if your next dose is less than 3 days away. Do not use extra medicine to make up the missed dose. Do not use this medicine twice within a 72-hour period. What happens if I overdose? Seek emergency medical attention or call the Poison Help line at 1-470.851.1322. Overdose symptoms may include severe nausea and vomiting. What should I avoid while using dulaglutide? Never share an injection pen or prefilled syringe with another person, even if the needle has been changed. Sharing these devices can allow infections or disease to pass from one person to another. What are the possible side effects of dulaglutide? Get emergency medical help if you have signs of an allergic reaction: hives; difficult breathing; swelling of your face, lips, tongue, or throat. Call your doctor at once if you have:  · symptoms of pancreatitis --severe pain in your upper stomach spreading to your back, nausea and vomiting, fast heart rate;  · signs of a thyroid tumor --swelling or a lump in your neck, trouble swallowing, a hoarse voice, or if you feel short of breath;  · low blood sugar --headache, hunger, weakness, sweating, confusion, irritability, dizziness, fast heart rate, or feeling jittery; or  · signs of a kidney problem --little or no urinating; painful or difficult urination; swelling in your feet or ankles; feeling tired or short of breath. Common side effects may include:  · stomach pain, indigestion;  · nausea, diarrhea, vomiting; or  · loss of appetite. This is not a complete list of side effects and others may occur.  Call your doctor for medical advice about side effects. You may report side effects to FDA at 0-249-FDA-4190. What other drugs will affect dulaglutide? Some drugs can affect how well dulaglutide controls your blood sugar, and dulaglutide may affect how other drugs work. Tell your doctor about all your current medicines and any you start or stop using, especially:  · all diabetes medications you use; and  · all medicines you take by mouth. This list is not complete. Other drugs may interact with dulaglutide, including prescription and over-the-counter medicines, vitamins, and herbal products. Not all possible interactions are listed in this medication guide. Where can I get more information? Your pharmacist can provide more information about dulaglutide. Remember, keep this and all other medicines out of the reach of children, never share your medicines with others, and use this medication only for the indication prescribed. Every effort has been made to ensure that the information provided by [address] is accurate, up-to-date, and complete, but no guarantee is made to that effect. Drug information contained herein may be time sensitive. [employer] information has been compiled for use by healthcare practitioners and consumers in the [place] and therefore [employer] does not warrant that uses outside of the [place] are appropriate, unless specifically indicated otherwise. [facility] [employer] [facility] drug information does not endorse drugs, diagnose patients or recommend therapy. [employer] drug information is an informational resource designed to assist licensed healthcare practitioners in caring for their patients and/or to serve consumers viewing this service as a supplement to, and not a substitute for, the expertise, skill, knowledge and judgment of healthcare practitioners.  The absence of a warning for a given drug or drug combination in no way should be construed to indicate that the drug or drug combination is safe, effective or appropriate for any given patient. [facility] does not assume any responsibility for any aspect of healthcare administered with the aid of information [facility] provides. The information contained herein is not intended to cover all possible uses, directions, precautions, warnings, drug interactions, allergic reactions, or adverse effects. If you have questions about the drugs you are taking, check with your doctor, nurse or pharmacist.  Copyright 9347-7885 [address] [place] [address] Dr GILMORE. Version: 1.03. Revision date: 2/6/2017. Care instructions adapted under license by [facility] ([facility]). If you have questions about a medical condition or this instruction, always ask your healthcare professional. [address] any warranty or liability for your use of this information.

## 2019-05-06 ENCOUNTER — TELEPHONE (OUTPATIENT)
Dept: ENDOCRINOLOGY | Age: 64
End: 2019-05-06

## 2019-05-06 NOTE — TELEPHONE ENCOUNTER
Pt called again and left a voicemail stating she called [employer] and she would like a call back from us

## 2019-05-06 NOTE — TELEPHONE ENCOUNTER
Discussed all her medication   She will resume her boluses along with basal insulin   Advised to get creatinine in a week after starting [facility]

## 2019-05-07 ENCOUNTER — TELEPHONE (OUTPATIENT)
Dept: ENDOCRINOLOGY | Age: 64
End: 2019-05-07

## 2019-05-07 NOTE — TELEPHONE ENCOUNTER
Pt said synjardy makes her feel weird and she is not going to take it. Removed med from med list. Pt does not want to schedule earlier appt at this time, she first wanted dr Krzysztof Frazier to know she stopped taking synjardy.  Pt is ok with keeping appt as is (8/6)

## 2019-05-08 NOTE — TELEPHONE ENCOUNTER
It's okay if she is not able to tolerate Synjardy.  I  want her to start sending blood sugar logs to me on weekly basis so we can adjust her medications and insulin dosing

## 2019-05-14 NOTE — TELEPHONE ENCOUNTER
Pt aware she plans to start glipizide, then may start another medication, she did not know the name. She will work on sending logs and call if she makes any other meds changes.

## 2019-05-16 ENCOUNTER — TELEPHONE (OUTPATIENT)
Dept: ENDOCRINOLOGY | Age: 64
End: 2019-05-16

## 2019-05-17 ENCOUNTER — TELEPHONE (OUTPATIENT)
Dept: ENDOCRINOLOGY | Age: 64
End: 2019-05-17

## 2019-05-17 NOTE — TELEPHONE ENCOUNTER
Left message for patient to return call. Spoke with patient today, 5/24/19. Encouraged patient begin taking Novalog before meals instead of waiting until after meals. Pt. Franklyn Johnson. Patient is scheduled for appointment with me 5/29/19.

## 2019-05-21 NOTE — TELEPHONE ENCOUNTER
Pt calling back.  She canceled her appt for this Thursday and rescheduled for following Thursday 5-30

## 2019-08-01 RX ORDER — INSULIN DETEMIR 100 [IU]/ML
INJECTION, SOLUTION SUBCUTANEOUS
Qty: 20 PEN | Refills: 1 | Status: SHIPPED | OUTPATIENT
Start: 2019-08-01 | End: 2019-08-22 | Stop reason: SDUPTHER

## 2019-08-06 ENCOUNTER — OFFICE VISIT (OUTPATIENT)
Dept: ENDOCRINOLOGY | Age: 64
End: 2019-08-06
Payer: MEDICARE

## 2019-08-06 VITALS
DIASTOLIC BLOOD PRESSURE: 62 MMHG | OXYGEN SATURATION: 98 % | WEIGHT: 268 LBS | SYSTOLIC BLOOD PRESSURE: 138 MMHG | HEIGHT: 63 IN | BODY MASS INDEX: 47.48 KG/M2 | HEART RATE: 69 BPM

## 2019-08-06 DIAGNOSIS — E55.9 VITAMIN D DEFICIENCY: ICD-10-CM

## 2019-08-06 DIAGNOSIS — M79.7 FIBROMYALGIA: ICD-10-CM

## 2019-08-06 DIAGNOSIS — E66.01 CLASS 3 SEVERE OBESITY DUE TO EXCESS CALORIES WITH SERIOUS COMORBIDITY AND BODY MASS INDEX (BMI) OF 45.0 TO 49.9 IN ADULT (HCC): ICD-10-CM

## 2019-08-06 DIAGNOSIS — I10 ESSENTIAL HYPERTENSION: ICD-10-CM

## 2019-08-06 PROCEDURE — 3017F COLORECTAL CA SCREEN DOC REV: CPT | Performed by: INTERNAL MEDICINE

## 2019-08-06 PROCEDURE — 1036F TOBACCO NON-USER: CPT | Performed by: INTERNAL MEDICINE

## 2019-08-06 PROCEDURE — 3046F HEMOGLOBIN A1C LEVEL >9.0%: CPT | Performed by: INTERNAL MEDICINE

## 2019-08-06 PROCEDURE — G8427 DOCREV CUR MEDS BY ELIG CLIN: HCPCS | Performed by: INTERNAL MEDICINE

## 2019-08-06 PROCEDURE — G8417 CALC BMI ABV UP PARAM F/U: HCPCS | Performed by: INTERNAL MEDICINE

## 2019-08-06 PROCEDURE — 2022F DILAT RTA XM EVC RTNOPTHY: CPT | Performed by: INTERNAL MEDICINE

## 2019-08-06 PROCEDURE — 99215 OFFICE O/P EST HI 40 MIN: CPT | Performed by: INTERNAL MEDICINE

## 2019-08-06 RX ORDER — GLUCOSAMINE HCL/CHONDROITIN SU 500-400 MG
CAPSULE ORAL
Qty: 600 STRIP | Refills: 5 | Status: SHIPPED | OUTPATIENT
Start: 2019-08-06 | End: 2019-11-19 | Stop reason: SDUPTHER

## 2019-08-06 RX ORDER — GLIPIZIDE 10 MG/1
10 TABLET, FILM COATED, EXTENDED RELEASE ORAL DAILY
Qty: 30 TABLET | Refills: 5 | Status: SHIPPED | OUTPATIENT
Start: 2019-08-06 | End: 2020-07-21

## 2019-08-06 RX ORDER — BLOOD-GLUCOSE METER
EACH MISCELLANEOUS
Qty: 1 KIT | Refills: 0 | COMMUNITY
Start: 2019-08-06

## 2019-08-06 NOTE — PROGRESS NOTES
Siobhan Eid is a 59 y.o. female  seen for the management of uncontrolled Type 2 diabetes, hyperlipidemia and obesity  . Pt has T2DM which is uncontrolled and is complicated by severe peripheral neuropathy ,retinopathy and nephropathy. Pt was diagnosed in year 1995 when she was being evaluated for hypertriglyceridemia GEORGE has been very noncompliant she admits that her depression contributes to her noncompliance   Brigitte Cespedes has been obese most of her adult life  and she doesn't follow any dietry restriction. -- she has CAD s/p  stents 2016  And has CHF as well follows with Dr Zoë Souza   ---She was in [facility] nov 2018 for pneumonia   Her fingerstick blood glucose  improved when  compliance improved in oct/nov 2017 but she stopped taking insulin regularly again and her A1c worsened  Finally she has started taking insulin more often and started checking her numbers and some of her fingerstick blood glucose were in the 100 range but she went back to her noncompliance after a few months of better control       INTERIM:    Diabetes   She presents for her follow-up diabetic visit. She has type 2 diabetes mellitus. No MedicAlert identification noted. The initial diagnosis of diabetes was made 23 years ago. Her disease course has been stable. Hypoglycemia symptoms include confusion, nervousness/anxiousness, sweats and tremors. Associated symptoms include weakness and weight loss. Pertinent negatives for diabetes include no chest pain and no fatigue. There are no hypoglycemic complications. Symptoms are stable. Diabetic complications include a CVA, heart disease, peripheral neuropathy and retinopathy. Risk factors for coronary artery disease include diabetes mellitus, dyslipidemia, family history and hypertension. She is compliant with treatment most of the time. Her weight is decreasing steadily. She is following a diabetic and low salt diet.  Meal planning includes avoidance of concentrated sweets and abnormal serum enzyme levels 5/3/2010    Other nonspecific abnormal serum enzyme levels [id number]    Pain in joint, pelvic region and thigh [id number]    Type II or unspecified type diabetes mellitus with neurological manifestations, not stated as uncontrolled(250.60) 5/3/2010    Type II or unspecified type diabetes mellitus without mention of complication, not stated as uncontrolled 5/3/2010    Unspecified essential hypertension 5/3/2010    Unspecified vitamin D deficiency 5/3/2010      Patient Active Problem List   Diagnosis    Vitamin D deficiency    Essential hypertension    Other and unspecified hyperlipidemia    Mononeuritis    Esophageal reflux    Depressive disorder, not elsewhere classified    Benign intracranial hypertension    Fibromyalgia    Uncontrolled type 2 diabetes mellitus with complication, with long-term current use of insulin (HCC)    Proteinuria    Hypercalcemia    Arthritis    Class 3 severe obesity due to excess calories with serious comorbidity and body mass index (BMI) of 45.0 to 49.9 in adult [facility])     Past Surgical History:   Procedure Laterality Date    CATARACT REMOVAL Bilateral 2018    GYNECOLOGIC CRYOSURGERY      D&C    HYSTERECTOMY, TOTAL ABDOMINAL      SHOULDER SURGERY      WRIST SURGERY       Social History     Socioeconomic History    Marital status: Single     Spouse name: Not on file    Number of children: Not on file    Years of education: Not on file    Highest education level: Not on file   Occupational History    Not on file   Social Needs    Financial resource strain: Not on file    Food insecurity:     Worry: Not on file     Inability: Not on file    Transportation needs:     Medical: Not on file     Non-medical: Not on file   Tobacco Use    Smoking status: Never Smoker    Smokeless tobacco: Never Used   Substance and Sexual Activity    Alcohol use: No    Drug use: No    Sexual activity: Not on file   Lifestyle    Physical activity: appearing and well nourished  Psychiatric: oriented to person, place and time, judgement and insight and normal, recent and remote memory and intact and mood and affect are normal  Skin: skin and subcutaneous tissue is normal without mass, normal turgor  Head and Face: examination of head and face revealed no abnormalities  Eyes: no lid or conjunctival swelling, erythema or discharge  Ears/Nose: external inspection of ears and nose revealed no abnormalities, hearing is grossly normal  Oropharynx/Mouth/Face: lips, tongue and gums are normal with no lesions, the voice quality was normal  Neck: neck is supple and symmetric, with midline trachea and no masses, thyroid is normal  Pulmonary: no increased work of breathing or signs of respiratory distress, lungs are clear to auscultation  Cardiovascular: normal heart rate and rhythm, normal S1 and S2, no murmurs and pedal pulses and 2+ bilaterally, No edema  Musculoskeletal: normal gait and station and exam of the digits and nails are normal  Neurological: normal coordination and normal general cortical function        Lab Results   Component Value Date    LABA1C 11.0 11/03/2018    LABA1C 9.0 05/04/2018    LABA1C 9.7 03/18/2011    LABA1C 11.0 12/31/2010         ASSESSMENT/PLAN:  --- Type 2 diabetes mellitus with complication, with long-term current use of insulin (HCC)---10.5>>11.1>>9.7>>8.5>>10.5>>11.0 >>11.3 >>9.5 >>9.4 >>10.0>>11.3>>10.3>>10.8>>10.4>>9.9>>11.9>>10.8>>9.9 >>8.5>>7.2>>9.1>>9.9>>11.3>>9.1    Due to noncompliance her A1c in April went up to 11 > now is>9.1  I had a lengthy discussion with the patient in regards to her uncontrolled diabetes .  In the end, we decided to switch back to oral pills in order to improve  compliance   --Glipizide 5 mg daily incrrease to 10 mg daily   --synjardy ( pt couldn't tolerate SE stopped it  Due to yeast infection )   ---started trulicity in may 9731 continue with that  ---GEORGE was taking 85 units BID of Levemir and almost

## 2019-08-07 ENCOUNTER — TELEPHONE (OUTPATIENT)
Dept: ENDOCRINOLOGY | Age: 64
End: 2019-08-07

## 2019-08-07 RX ORDER — INSULIN ASPART 100 [IU]/ML
INJECTION, SOLUTION INTRAVENOUS; SUBCUTANEOUS
Qty: 15 PEN | Refills: 4 | Status: SHIPPED | OUTPATIENT
Start: 2019-08-07 | End: 2019-08-22 | Stop reason: SDUPTHER

## 2019-08-07 NOTE — TELEPHONE ENCOUNTER
Levemir is once a day since I asked her to increase the Glucotrol.   Prescription says higher doses so she gets enough insulin if we have to increase the dose

## 2019-08-21 DIAGNOSIS — E11.21 TYPE 2 DIABETES MELLITUS WITH DIABETIC NEPHROPATHY, WITH LONG-TERM CURRENT USE OF INSULIN (HCC): ICD-10-CM

## 2019-08-21 DIAGNOSIS — Z79.4 TYPE 2 DIABETES MELLITUS WITH DIABETIC NEPHROPATHY, WITH LONG-TERM CURRENT USE OF INSULIN (HCC): ICD-10-CM

## 2019-08-21 DIAGNOSIS — E55.9 VITAMIN D DEFICIENCY: ICD-10-CM

## 2019-08-21 RX ORDER — GEMFIBROZIL 600 MG/1
TABLET, FILM COATED ORAL
Qty: 180 TABLET | Refills: 0 | Status: SHIPPED | OUTPATIENT
Start: 2019-08-21 | End: 2019-11-19 | Stop reason: SDUPTHER

## 2019-08-22 ENCOUNTER — OFFICE VISIT (OUTPATIENT)
Dept: ENDOCRINOLOGY | Age: 64
End: 2019-08-22
Payer: MEDICARE

## 2019-08-22 PROCEDURE — 97802 MEDICAL NUTRITION INDIV IN: CPT | Performed by: DIETITIAN, REGISTERED

## 2019-08-22 ASSESSMENT — PROBLEM AREAS IN DIABETES QUESTIONNAIRE (PAID)
FEELING DEPRESSED WHEN YOU THINK ABOUT LIVING WITH DIABETES: 0
WORRYING ABOUT THE FUTURE AND THE POSSIBILITY OF SERIOUS COMPLICATIONS: 4
COPING WITH COMPLICATIONS OF DIABETES: 2
PAID-5 TOTAL SCORE: 8
FEELING SCARED WHEN YOU THINK ABOUT LIVING WITH DIABETES: 1
FEELING THAT DIABETES IS TAKING UP TOO MUCH OF YOUR MENTAL AND PHYSICAL ENERGY EVERY DAY: 1

## 2019-08-22 NOTE — PROGRESS NOTES
severe obesity due to excess calories with serious comorbidity and body mass index (BMI) of 45.0 to 49.9 in adult [facility])       Current Outpatient Medications   Medication Sig Dispense Refill    gemfibrozil (LOPID) 600 MG tablet TAKE ONE TABLET BY MOUTH TWICE A  tablet 0    NOVOLOG FLEXPEN 100 UNIT/ML injection pen INJECT 80 UNITS SUBCUTANEOUSLY THREE TIMES A DAY 15 pen 4    blood glucose monitor strips Test blood sugar 6 times per day, one touch verio flex test strip 600 strip 5    Blood Glucose Monitoring Suppl (ONETOUCH VERIO FLEX SYSTEM) w/Device KIT Check blood sugar 1 kit 0    glipiZIDE (GLUCOTROL XL) 10 MG extended release tablet Take 1 tablet by mouth daily 30 tablet 5    LEVEMIR FLEXTOUCH 100 UNIT/ML injection pen INJECT 95 UNITS SUBCUTANEOUSLY TWO TIMES A DAY (Patient taking differently: 85 Units 2 times daily ) 20 pen 1    Dulaglutide (TRULICITY) 1.5 SL/5.5CT SOPN Weekly 1.5 mg 4 pen 5    blood glucose test strips (ASCENSIA AUTODISC VI;ONE TOUCH ULTRA TEST VI) strip Test  5-6 times daily 200 each 3    diazepam (VALIUM) 5 MG tablet Take 5 mg by mouth 2 times daily as needed for Anxiety. Caleb De La Fuente doxepin (SINEQUAN) 50 MG capsule Take 100 mg by mouth nightly      Continuous Blood Gluc Sensor ([address] [place] [address]) MISC To be changed every 10 days to monitor glucose 3 each 3    aspirin 81 MG tablet Take 81 mg by mouth      insulin aspart (NOVOLOG) 100 UNIT/ML injection vial Inject 60 Units into the skin       pravastatin (PRAVACHOL) 80 MG tablet Take 80 mg by mouth      lisinopril (PRINIVIL;ZESTRIL) 20 MG tablet Take 20 mg by mouth daily      HYDROcodone-acetaminophen (NORCO) 5-325 MG per tablet Take 5-250 tablets by mouth every 6 hours as needed for Pain.       citalopram (CELEXA) 20 MG tablet Take 40 mg by mouth daily       insulin detemir (LEVEMIR) 100 UNIT/ML injection vial Inject 85 Units into the skin 2 times daily       torsemide (DEMADEX) 20 MG tablet Take 100 mg by mouth every other day       Erythromycin 2 % OINT Apply topically 3 times daily      gabapentin (NEURONTIN) 600 MG tablet Take 600 mg by mouth 3 times daily.  pregabalin (LYRICA) 200 MG capsule Take 200 mg by mouth. One tid      Insulin Syringes, Disposable, U-100 0.5 ML MISC by Does not apply route. Use bid or as directed       Blood Glucose Monitoring Suppl (ONE TOUCH TEST STRIP RAGLAND) by Does not apply route. Tests six times a day       LamoTRIgine (LAMICTAL XR) 200 MG TB24 Take 200 mg by mouth daily        No current facility-administered medications for this visit. NUTRITION ASSESSMENT    Biochemical Data:    Lab Results   Component Value Date    LABA1C 11.0 11/03/2018     Lab Results   Component Value Date    .7 03/18/2011       Lab Results   Component Value Date    CHOL 189 05/04/2018    CHOL 220 (H) 03/18/2011    CHOL 122 08/13/2010     Lab Results   Component Value Date    TRIG 311 05/04/2018    TRIG 368 (H) 03/18/2011    TRIG 282 (H) 08/13/2010     Lab Results   Component Value Date    HDL 39 05/04/2018    HDL 38 (L) 03/18/2011    HDL 30 (L) 08/13/2010     Lab Results   Component Value Date    LDLCALC 88 05/04/2018    LDLCALC 36 08/13/2010    LDLCHOLESTEROL 43 11/18/2009     Lab Results   Component Value Date    LABVLDL 56 08/13/2010     No results found for: [facility]    Lab Results   Component Value Date    WBC 7.6 12/31/2010    HGB 16.4 (H) 12/31/2010    HCT 48.7 (H) 12/31/2010    MCV 87 12/31/2010     12/31/2010       Lab Results   Component Value Date    CREATININE 1.06 11/15/2018    BUN 33 11/15/2018     11/15/2018    K 4.3 11/15/2018     11/15/2018    CO2 33 11/15/2018       Diabetes Medications:  Yes  Knows name and dose of prescribed medications   Knows prescribed schedule for medications  Recent change in medication type/dosage:   Stores  medications properly  Comments:     Monitoring:   Has BG meter: Yes  Testing frequency: inconsistent  Recent results: Hypoglycemia? No    Anthropometric Measurements: Wt:   Wt Readings from Last 3 Encounters:   08/06/19 268 lb (121.6 kg)   05/02/19 270 lb (122.5 kg)   01/22/19 266 lb 6.4 oz (120.8 kg)      BMI:   BMI Readings from Last 3 Encounters:   08/06/19 47.47 kg/m²   05/02/19 47.83 kg/m²   01/22/19 47.19 kg/m²     Patient's stated goal weight:   7% Weight loss goal weight:     Physical Activity History:   Physical activity: none  Frequency of activity:   Duration of activity:   Obstacles to activity:     Food and Nutrition History:   Nutrition Awareness/Previous DSMES: yes  Beverage consumption: water4, 16 ounce bottles, diet pop-1 plus  Alcohol consumption: none  Frequency of Meals Eaten away from home:daily, maybe multiple daily  Food Availability Problems: No  Usual Food consumption:   Variable eating schedule, high carbohydrate food portions     Barriers:   -none          Follow Up Plan: 2 months Patient has my contact information.     Referring Provider: Tona Tao MD  Time spent with patient: 60 minutes

## 2019-09-09 ENCOUNTER — TELEPHONE (OUTPATIENT)
Dept: ENDOCRINOLOGY | Age: 64
End: 2019-09-09

## 2019-10-21 ENCOUNTER — TELEPHONE (OUTPATIENT)
Dept: ENDOCRINOLOGY | Age: 64
End: 2019-10-21

## 2019-11-19 ENCOUNTER — OFFICE VISIT (OUTPATIENT)
Dept: ENDOCRINOLOGY | Age: 64
End: 2019-11-19
Payer: MEDICARE

## 2019-11-19 VITALS
WEIGHT: 271 LBS | HEART RATE: 60 BPM | DIASTOLIC BLOOD PRESSURE: 50 MMHG | HEIGHT: 63 IN | BODY MASS INDEX: 48.02 KG/M2 | OXYGEN SATURATION: 98 % | SYSTOLIC BLOOD PRESSURE: 100 MMHG

## 2019-11-19 DIAGNOSIS — Z79.4 TYPE 2 DIABETES MELLITUS WITH DIABETIC NEPHROPATHY, WITH LONG-TERM CURRENT USE OF INSULIN (HCC): Primary | ICD-10-CM

## 2019-11-19 DIAGNOSIS — E55.9 VITAMIN D DEFICIENCY: ICD-10-CM

## 2019-11-19 DIAGNOSIS — I50.20 SYSTOLIC CONGESTIVE HEART FAILURE, UNSPECIFIED HF CHRONICITY (HCC): ICD-10-CM

## 2019-11-19 DIAGNOSIS — E11.21 TYPE 2 DIABETES MELLITUS WITH DIABETIC NEPHROPATHY, WITH LONG-TERM CURRENT USE OF INSULIN (HCC): Primary | ICD-10-CM

## 2019-11-19 DIAGNOSIS — I10 ESSENTIAL HYPERTENSION: ICD-10-CM

## 2019-11-19 DIAGNOSIS — E66.01 CLASS 3 SEVERE OBESITY DUE TO EXCESS CALORIES WITH SERIOUS COMORBIDITY AND BODY MASS INDEX (BMI) OF 45.0 TO 49.9 IN ADULT (HCC): ICD-10-CM

## 2019-11-19 PROCEDURE — G8484 FLU IMMUNIZE NO ADMIN: HCPCS | Performed by: INTERNAL MEDICINE

## 2019-11-19 PROCEDURE — 3046F HEMOGLOBIN A1C LEVEL >9.0%: CPT | Performed by: INTERNAL MEDICINE

## 2019-11-19 PROCEDURE — 1036F TOBACCO NON-USER: CPT | Performed by: INTERNAL MEDICINE

## 2019-11-19 PROCEDURE — G8417 CALC BMI ABV UP PARAM F/U: HCPCS | Performed by: INTERNAL MEDICINE

## 2019-11-19 PROCEDURE — 99215 OFFICE O/P EST HI 40 MIN: CPT | Performed by: INTERNAL MEDICINE

## 2019-11-19 PROCEDURE — 3017F COLORECTAL CA SCREEN DOC REV: CPT | Performed by: INTERNAL MEDICINE

## 2019-11-19 PROCEDURE — 2022F DILAT RTA XM EVC RTNOPTHY: CPT | Performed by: INTERNAL MEDICINE

## 2019-11-19 PROCEDURE — G8427 DOCREV CUR MEDS BY ELIG CLIN: HCPCS | Performed by: INTERNAL MEDICINE

## 2019-11-19 RX ORDER — GEMFIBROZIL 600 MG/1
TABLET, FILM COATED ORAL
Qty: 180 TABLET | Refills: 0 | Status: SHIPPED | OUTPATIENT
Start: 2019-11-19 | End: 2020-11-18

## 2019-11-19 RX ORDER — GLIPIZIDE 10 MG/1
10 TABLET, FILM COATED, EXTENDED RELEASE ORAL DAILY
Qty: 30 TABLET | Refills: 5 | Status: CANCELLED | OUTPATIENT
Start: 2019-11-19 | End: 2020-11-18

## 2019-11-19 RX ORDER — GLUCOSAMINE HCL/CHONDROITIN SU 500-400 MG
CAPSULE ORAL
Qty: 600 STRIP | Refills: 5 | Status: SHIPPED | OUTPATIENT
Start: 2019-11-19 | End: 2020-11-18 | Stop reason: SDUPTHER

## 2019-11-20 ENCOUNTER — TELEPHONE (OUTPATIENT)
Dept: ENDOCRINOLOGY | Age: 64
End: 2019-11-20

## 2019-11-20 DIAGNOSIS — Z79.4 TYPE 2 DIABETES MELLITUS WITH DIABETIC NEPHROPATHY, WITH LONG-TERM CURRENT USE OF INSULIN (HCC): Primary | ICD-10-CM

## 2019-11-20 DIAGNOSIS — E11.21 TYPE 2 DIABETES MELLITUS WITH DIABETIC NEPHROPATHY, WITH LONG-TERM CURRENT USE OF INSULIN (HCC): Primary | ICD-10-CM

## 2019-11-20 RX ORDER — BLOOD-GLUCOSE TRANSMITTER
EACH MISCELLANEOUS
Qty: 1 EACH | Refills: 3 | Status: SHIPPED | OUTPATIENT
Start: 2019-11-20 | End: 2020-11-18

## 2019-11-20 RX ORDER — BLOOD-GLUCOSE SENSOR
EACH MISCELLANEOUS
Qty: 9 EACH | Refills: 3 | Status: SHIPPED | OUTPATIENT
Start: 2019-11-20 | End: 2020-11-18

## 2019-11-20 RX ORDER — BLOOD-GLUCOSE,RECEIVER,CONT
EACH MISCELLANEOUS
Qty: 1 DEVICE | Refills: 0 | Status: SHIPPED | OUTPATIENT
Start: 2019-11-20 | End: 2020-11-18

## 2019-12-20 DIAGNOSIS — E11.21 TYPE 2 DIABETES MELLITUS WITH DIABETIC NEPHROPATHY, WITH LONG-TERM CURRENT USE OF INSULIN (HCC): Primary | ICD-10-CM

## 2019-12-20 DIAGNOSIS — Z79.4 TYPE 2 DIABETES MELLITUS WITH DIABETIC NEPHROPATHY, WITH LONG-TERM CURRENT USE OF INSULIN (HCC): Primary | ICD-10-CM

## 2020-01-04 ENCOUNTER — TELEPHONE (OUTPATIENT)
Dept: ENDOCRINOLOGY | Age: 65
End: 2020-01-04

## 2020-01-05 NOTE — TELEPHONE ENCOUNTER
[place] [address] facility called that patient has . Her BG was in 200s in AM, then she was gone for lunch, not clear when ate. She was back and ready for dinner. No nausea, vomiting, abdominal pain, fever, does not appear sick. Her total dose of Novolog for dinner was 50 units. I asked to call me with bedtime BG. At 8:45 PM BG was 221. Asked nurse to call me in few hours to adjust Levemir if BG below 200. I have not received more phone calls.

## 2020-01-09 LAB
AVERAGE GLUCOSE: NORMAL
HBA1C MFR BLD: 8.9 %

## 2020-02-04 ENCOUNTER — TELEPHONE (OUTPATIENT)
Dept: ENDOCRINOLOGY | Age: 65
End: 2020-02-04

## 2020-02-04 NOTE — TELEPHONE ENCOUNTER
Pt called and left a voicemail asking if we rec'd her forms to fill out so she doesn't get her license suspended.  She needs them soon  # [phone]

## 2020-02-04 NOTE — TELEPHONE ENCOUNTER
Pt called back, told her we have not rec'd anything from [address] so she is going to have them fax over again

## 2020-02-05 NOTE — TELEPHONE ENCOUNTER
Pt called back to follow up on BMV papers. Told her that nurse was out of office today and she will be back Thursday to fill it out.  She states ok and would like it faxed in

## 2020-02-19 ENCOUNTER — OFFICE VISIT (OUTPATIENT)
Dept: ENDOCRINOLOGY | Age: 65
End: 2020-02-19
Payer: MEDICARE

## 2020-02-19 VITALS
HEIGHT: 63 IN | BODY MASS INDEX: 45.18 KG/M2 | DIASTOLIC BLOOD PRESSURE: 50 MMHG | OXYGEN SATURATION: 97 % | WEIGHT: 255 LBS | SYSTOLIC BLOOD PRESSURE: 102 MMHG | HEART RATE: 91 BPM

## 2020-02-19 PROCEDURE — 1036F TOBACCO NON-USER: CPT | Performed by: INTERNAL MEDICINE

## 2020-02-19 PROCEDURE — G8484 FLU IMMUNIZE NO ADMIN: HCPCS | Performed by: INTERNAL MEDICINE

## 2020-02-19 PROCEDURE — 2022F DILAT RTA XM EVC RTNOPTHY: CPT | Performed by: INTERNAL MEDICINE

## 2020-02-19 PROCEDURE — 99214 OFFICE O/P EST MOD 30 MIN: CPT | Performed by: INTERNAL MEDICINE

## 2020-02-19 PROCEDURE — G8417 CALC BMI ABV UP PARAM F/U: HCPCS | Performed by: INTERNAL MEDICINE

## 2020-02-19 PROCEDURE — 3017F COLORECTAL CA SCREEN DOC REV: CPT | Performed by: INTERNAL MEDICINE

## 2020-02-19 PROCEDURE — G8427 DOCREV CUR MEDS BY ELIG CLIN: HCPCS | Performed by: INTERNAL MEDICINE

## 2020-02-19 PROCEDURE — 3052F HG A1C>EQUAL 8.0%<EQUAL 9.0%: CPT | Performed by: INTERNAL MEDICINE

## 2020-02-19 NOTE — PROGRESS NOTES
rarely participates in exercise. Her home blood glucose trend is decreasing steadily. Her breakfast blood glucose is taken between 8-9 am. Her breakfast blood glucose range is generally 130-140 mg/dl. Her lunch blood glucose is taken between 12-1 pm. Her lunch blood glucose range is generally 130-140 mg/dl. Her dinner blood glucose is taken between 6-7 pm. Her dinner blood glucose range is generally 140-180 mg/dl. An ACE inhibitor/angiotensin II receptor blocker is being taken. She sees a podiatrist.Eye exam is current. She has been admitted to the hospital as well as to a rehab facility for CHF and renal insufficiency  .   She is being closely monitored and followed by the [facility] failure [facility] at [facility]  She underwent PT and OT in a rehab facility       Weight trend: stable  Prior visit with dietician: yes  Current diet: on average, 3 meals per day  Current exercise: rare    Past Medical History:   Diagnosis Date    Abdominal pain, right upper quadrant 82752133    Abnormal weight gain 5/3/2010    Bell's palsy [id number]    Benign intracranial hypertension 5/3/2010    Class 3 severe obesity due to excess calories with serious comorbidity and body mass index (BMI) of 45.0 to 49.9 in adult [facility] 1/22/2019    Depressive disorder, not elsewhere classified 5/3/2010    Dizziness and giddiness     Edema     Esophageal reflux 5/3/2010    Fibromyalgia     Gastroparesis     Lumbago     Mononeuritis of unspecified site 5/3/2010    Onychia and paronychia of toe [id number]    Other and unspecified hyperlipidemia 5/3/2010    Other nonspecific abnormal serum enzyme levels 5/3/2010    Other nonspecific abnormal serum enzyme levels [id number]    Pain in joint, pelvic region and thigh [id number]    Type II or unspecified type diabetes mellitus with neurological manifestations, not stated as uncontrolled(250.60) 5/3/2010    Type II or unspecified type diabetes mellitus without mention of complication, not stated as uncontrolled 5/3/2010    Unspecified essential hypertension 5/3/2010    Unspecified vitamin D deficiency 5/3/2010      Patient Active Problem List   Diagnosis    Vitamin D deficiency    Essential hypertension    Other and unspecified hyperlipidemia    Mononeuritis    Esophageal reflux    Depressive disorder, not elsewhere classified    Benign intracranial hypertension    Fibromyalgia    Uncontrolled type 2 diabetes mellitus with complication, with long-term current use of insulin (HCC)    Proteinuria    Hypercalcemia    Arthritis    Class 3 severe obesity due to excess calories with serious comorbidity and body mass index (BMI) of 45.0 to 49.9 in adult [facility])     Past Surgical History:   Procedure Laterality Date    CATARACT REMOVAL Bilateral 2018    GYNECOLOGIC CRYOSURGERY      D&C    HYSTERECTOMY, TOTAL ABDOMINAL      SHOULDER SURGERY      WRIST SURGERY       Social History     Socioeconomic History    Marital status: Single     Spouse name: Not on file    Number of children: Not on file    Years of education: Not on file    Highest education level: Not on file   Occupational History    Not on file   Social Needs    Financial resource strain: Not on file    Food insecurity:     Worry: Not on file     Inability: Not on file    Transportation needs:     Medical: Not on file     Non-medical: Not on file   Tobacco Use    Smoking status: Never Smoker    Smokeless tobacco: Never Used   Substance and Sexual Activity    Alcohol use: No    Drug use: No    Sexual activity: Not on file   Lifestyle    Physical activity:     Days per week: Not on file     Minutes per session: Not on file    Stress: Not on file   Relationships    Social connections:     Talks on phone: Not on file     Gets together: Not on file     Attends Caodaism service: Not on file     Active member of club or organization: Not on file     Attends meetings of clubs or organizations: Not on file     Relationship status: Not on file    Intimate partner violence:     Fear of current or ex partner: Not on file     Emotionally abused: Not on file     Physically abused: Not on file     Forced sexual activity: Not on file   Other Topics Concern    Not on file   Social History Narrative    Not on file     Family History   Problem Relation Age of Onset    Cancer Mother     Diabetes Mother     High Blood Pressure Mother     Cancer Father     Diabetes Father     High Blood Pressure Father      Current Outpatient Medications   Medication Sig Dispense Refill    insulin detemir (LEVEMIR FLEXTOUCH) 100 UNIT/ML injection pen INJECT 95 UNITS SUBCUTANEOUSLY TWO TIMES A DAY 20 pen 1    insulin aspart (NOVOLOG FLEXPEN) 100 UNIT/ML injection pen Take 80 units three times daily 50 pen 4    Insulin Pen Needle (B-D UF III MINI PEN NEEDLES) 31G X 5 MM MISC Use 6 times a day 200 each 5    Insulin Pen Needle (B-D UF III MINI PEN NEEDLES) 31G X 5 MM MISC Use five times daily for insulin 150 each 3    Continuous Blood Gluc  (DEXCOM G6 ) AMAURY Use to check blood sugar QID 1 Device 0    Continuous Blood Gluc Sensor (DEXCOM G6 SENSOR) MISC Change sensor every 10 days 9 each 3    Continuous Blood Gluc Transmit (DEXCOM G6 TRANSMITTER) MISC Change transmitter every 3 months 1 each 3    gemfibrozil (LOPID) 600 MG tablet TAKE ONE TABLET BY MOUTH TWICE A  tablet 0    blood glucose monitor strips Test blood sugar 6 times per day, one touch verio flex test strip 600 strip 5    Dulaglutide (TRULICITY) 1.5 ON/2.0JM SOPN Weekly 1.5 mg 4 pen 5    Blood Glucose Monitoring Suppl (ONETOUCH VERIO FLEX SYSTEM) w/Device KIT Check blood sugar 1 kit 0    glipiZIDE (GLUCOTROL XL) 10 MG extended release tablet Take 1 tablet by mouth daily 30 tablet 5    blood glucose test strips (ASCENSIA AUTODISC VI;ONE TOUCH ULTRA TEST VI) strip Test  5-6 times daily 200 each 3    diazepam (VALIUM) 5 MG tablet Take 5 mg by mouth 2 times daily as needed for Anxiety. Ronel Cyp doxepin (SINEQUAN) 50 MG capsule Take 100 mg by mouth nightly      Continuous Blood Gluc Sensor ([address] [place] [address]) MISC To be changed every 10 days to monitor glucose 3 each 3    aspirin 81 MG tablet Take 81 mg by mouth      pravastatin (PRAVACHOL) 80 MG tablet Take 80 mg by mouth      HYDROcodone-acetaminophen (NORCO) 5-325 MG per tablet Take 5-250 tablets by mouth every 6 hours as needed for Pain.  citalopram (CELEXA) 20 MG tablet Take 40 mg by mouth daily       insulin detemir (LEVEMIR) 100 UNIT/ML injection vial Inject 85 Units into the skin 2 times daily       torsemide (DEMADEX) 20 MG tablet Take 200 mg by mouth 200mg Mon and th      Erythromycin 2 % OINT Apply topically 3 times daily      gabapentin (NEURONTIN) 600 MG tablet Take 300 mg by mouth 3 times daily.  pregabalin (LYRICA) 200 MG capsule Take 200 mg by mouth. One tid      Insulin Syringes, Disposable, U-100 0.5 ML MISC by Does not apply route. Use bid or as directed       Blood Glucose Monitoring Suppl (ONE TOUCH TEST STRIP RAGLAND) by Does not apply route. Tests six times a day       LamoTRIgine (LAMICTAL XR) 200 MG TB24 Take 200 mg by mouth daily        No current facility-administered medications for this visit. Allergies   Allergen Reactions    Lipitor     Sumatriptan      Family Status   Relation Name Status    Mother  Alive    Father      Sister  Alive       Review of Systems  I have reviewed the review of system questionnaire filled by the patient .   Patient was advised to contact PCP for non endocrine signs and symptoms       OBJECTIVE:  BP (!) 102/50   Pulse 91   Ht 5' 3\" (1.6 m)   Wt 255 lb (115.7 kg)   SpO2 97%   BMI 45.17 kg/m²    Wt Readings from Last 3 Encounters:   20 255 lb (115.7 kg)   19 271 lb (122.9 kg)   19 268 lb (121.6 kg)     Constitutional: no acute distress, well appearing, well nourished  Psychiatric: oriented to person, --synjardy ( pt couldn't tolerate SE stopped it  Due to yeast infection )   ---started trulicity in may 8107 continue with that     She checks her fingerstick glucose 4-5 times daily  Discussed getting Dexcom to improve glycemia , this was not pursued/approved    Health maintenance  she had diabetic retinopthay and ended up getting laser treatment she is s/p laser --- 2019  -last microalb/creat ratio was 97.7>> 43>>78 in 8/2019 abnormal labs discussed in detail with the patient   has nephropathy aware of that ---she now follows with nephrologist   -foot exam peripheral neuropathy follows with a neurologist and is on lyrica and gabapentin.& __ left foot hammer toe infection healed after tendon release in march 2018   Follows with podiatry every 3 months last isit in 2019     - she has one [address] level which was not normal so I ordred 24 hr urine cortisol and 1 mg dex supression which pt never got done ---      --- Essential hypertension and congestive heart failure  Stable follows with Dr Roma Loaiza   She has been admitted multiple times with decompensated heart failure      ---- Mixed hyperlipidemia  HYPERTRIGLYCERIDEMIA level was 621>. 231 >>441>>336>>1089>>300 >>196  High pancreatitis risk I gave her printed hanout very low fat diet as well starting Lopid with crestor ---Sideeffects are more with combo with statins so she is asked to have repeat labs in a month   Advised to take insulin as well as started gemfibrozil ---pravachol 40      --- Class 3 obesity due to excess calories with serious comorbidity in adult, unspecified BMI  Weight stable   GASTRIC SLEEVE SURGERY planned for march 2020     CAD s/p angioplasty   Follows with cardiology   Did cardiac rehab   Heart cath in oct 2017     Vit D def level was 14.3 improved to mid 26 >>23.4   patient is not compliant with vitamin D    DEXA scan 2015 was normal   Repeat in September 2018 had normal bone mineral density    PSYCH ISSUES /BIPOLAR DISORDER   on remeron   Follows

## 2020-02-21 NOTE — TELEPHONE ENCOUNTER
Fax from [facility].  Insulin Aspa injection has been provided to the patient as a temporary supply its not on our list of covered drugs (formulary)

## 2020-03-12 NOTE — TELEPHONE ENCOUNTER
Fax from [facility].  Insulin Aspa Inj Flexpen is not on list of covered drug so pt has been provided a temporary supply

## 2020-05-14 ENCOUNTER — TELEPHONE (OUTPATIENT)
Dept: ENDOCRINOLOGY | Age: 65
End: 2020-05-14

## 2020-06-17 ENCOUNTER — TELEPHONE (OUTPATIENT)
Dept: ENDOCRINOLOGY | Age: 65
End: 2020-06-17

## 2020-06-29 ENCOUNTER — TELEPHONE (OUTPATIENT)
Dept: ENDOCRINOLOGY | Age: 65
End: 2020-06-29

## 2020-07-21 ENCOUNTER — VIRTUAL VISIT (OUTPATIENT)
Dept: ENDOCRINOLOGY | Age: 65
End: 2020-07-21
Payer: MEDICARE

## 2020-07-21 PROBLEM — I25.10 CORONARY ARTERY DISEASE INVOLVING NATIVE CORONARY ARTERY OF NATIVE HEART WITHOUT ANGINA PECTORIS: Status: ACTIVE | Noted: 2020-07-21

## 2020-07-21 PROCEDURE — 99443 PR PHYS/QHP TELEPHONE EVALUATION 21-30 MIN: CPT | Performed by: INTERNAL MEDICINE

## 2020-07-21 NOTE — PROGRESS NOTES
Celestina Severs is a 62 y.o. female  seen for the management of uncontrolled Type 2 diabetes, hyperlipidemia and obesity  . Pt has T2DM which is uncontrolled and is complicated by severe peripheral neuropathy ,retinopathy and nephropathy. Pt was diagnosed in year 1995 when she was being evaluated for hypertriglyceridemia GEORGE has been very noncompliant she admits that her depression contributes to her noncompliance   Angie Dan has been obese most of her adult life  and she doesn't follow any dietry restriction. -- she has CAD s/p  stents 2016  And has CHF as well follows with Dr Orville Price     Her fingerstick blood glucose  improved when  compliance improved in oct/nov 2017 but she stopped taking insulin regularly again and her A1c worsened  Finally she has started taking insulin more often and started checking her numbers and some of her fingerstick blood glucose were in the 100 range but she went back to her noncompliance after a few months of better control     ---she was hospitalized for CHF worsening and renal insufficiency in jan 2020    INTERIM:    Diabetes   She presents for her follow-up diabetic visit. She has type 2 diabetes mellitus. No MedicAlert identification noted. The initial diagnosis of diabetes was made 23 years ago. Her disease course has been stable. Hypoglycemia symptoms include confusion, nervousness/anxiousness, sweats and tremors. Associated symptoms include weakness and weight loss. Pertinent negatives for diabetes include no chest pain and no fatigue. There are no hypoglycemic complications. Symptoms are stable. Diabetic complications include a CVA, heart disease, peripheral neuropathy and retinopathy. Risk factors for coronary artery disease include diabetes mellitus, dyslipidemia, family history and hypertension. She is compliant with treatment most of the time. Her weight is decreasing steadily. She is following a diabetic and low salt diet.  Meal planning includes avoidance of concentrated sweets and carbohydrate counting. She has had a previous visit with a dietitian. She rarely participates in exercise. Her home blood glucose trend is decreasing steadily. Her breakfast blood glucose is taken between 8-9 am. Her breakfast blood glucose range is generally 130-140 mg/dl. Her lunch blood glucose is taken between 12-1 pm. Her lunch blood glucose range is generally 130-140 mg/dl. Her dinner blood glucose is taken between 6-7 pm. Her dinner blood glucose range is generally 140-180 mg/dl. An ACE inhibitor/angiotensin II receptor blocker is being taken. She sees a podiatrist.Eye exam is current.        She denies any hospitalization , denies any hypoglycemia   She takes levemir 80 units BID   Fasting are still in the 200 range   She is planning on getting gastric sleeve done and is working on diet       Weight trend: stable  Prior visit with dietician: yes  Current diet: on average, 3 meals per day  Current exercise: rare    Past Medical History:   Diagnosis Date    Abdominal pain, right upper quadrant [id number]    Abnormal weight gain 5/3/2010    Bell's palsy [id number]    Benign intracranial hypertension 5/3/2010    Class 3 severe obesity due to excess calories with serious comorbidity and body mass index (BMI) of 45.0 to 49.9 in adult ([facility] 1/22/2019    Depressive disorder, not elsewhere classified 5/3/2010    Dizziness and giddiness     Edema     Esophageal reflux 5/3/2010    Fibromyalgia     Gastroparesis     Lumbago     Mononeuritis of unspecified site 5/3/2010    Onychia and paronychia of toe [id number]    Other and unspecified hyperlipidemia 5/3/2010    Other nonspecific abnormal serum enzyme levels 5/3/2010    Other nonspecific abnormal serum enzyme levels [id number]    Pain in joint, pelvic region and thigh [id number]    Type II or unspecified type diabetes mellitus with neurological manifestations, not stated as uncontrolled(250.60) 5/3/2010    Type II or unspecified type diabetes mellitus tablet Take 300 mg by mouth 3 times daily.  pregabalin (LYRICA) 200 MG capsule Take 200 mg by mouth. Pt takes BID      LamoTRIgine (LAMICTAL XR) 200 MG TB24 Take 200 mg by mouth daily       Continuous Blood Gluc  (DEXCOM G6 ) AMAURY Use to check blood sugar QID (Patient not taking: Reported on 2020) 1 Device 0    Continuous Blood Gluc Sensor (DEXCOM G6 SENSOR) MISC Change sensor every 10 days (Patient not taking: Reported on 2020) 9 each 3    Continuous Blood Gluc Transmit (DEXCOM G6 TRANSMITTER) MISC Change transmitter every 3 months (Patient not taking: Reported on 2020) 1 each 3    Dulaglutide (TRULICITY) 1.5 /9.0FX SOPN Weekly 1.5 mg (Patient not taking: Reported on 2020) 4 pen 5    Continuous Blood Gluc Sensor ([address] [place] [address]) MISC To be changed every 10 days to monitor glucose (Patient not taking: Reported on 2020) 3 each 3     No current facility-administered medications for this visit. Allergies   Allergen Reactions    Lipitor     Sumatriptan      Family Status   Relation Name Status    Mother  Alive    Father      Sister  Alive       Review of Systems  I have reviewed the review of system questionnaire filled by the patient . Patient was advised to contact PCP for non endocrine signs and symptoms       OBJECTIVE:  There were no vitals taken for this visit. Wt Readings from Last 3 Encounters:   20 255 lb (115.7 kg)   19 271 lb (122.9 kg)   19 268 lb (121.6 kg)       Patient is  alert oriented to time ,place and person. Both recent and remote memory intact .   Patient has weighed themselves in the last few  weeks and it has been stable   Weight 260 lbs         Lab Results   Component Value Date    LABA1C 8.9 2020    LABA1C 11.3 2019    LABA1C 11.0 2018         ASSESSMENT/PLAN:  --- Type 2 diabetes mellitus with complication, with long-term current use of insulin ---10.5>>11.1>>9.7>>8.5>>10.5>>11.0 >>11.3 >>9.5 >>9.4 >>10.0>>11.3>>10.3>>10.8>>10.4>>9.9>>11.9>>10.8>>9.9 >>8.5>>7.2>>9.1>>9.9>>11.3>>9.1>>11.3>>8.9    ----She takes 80 units of Levemir and her fasting are running in the 200 range   ---Novolog 30--50  units + with each meal ---she will get her blood work done tomorrow, she was advised to call me back to go over the lab results. She is still planning on getting gastric sleeve surgery done in the next few months.   --Glipizide stopped in May 2019   --synjardy ( pt couldn't tolerate SE stopped it  Due to yeast infection )   ---started trulicity in may 6940 continue with that     She checks her fingerstick glucose 4-5 times daily  Discussed getting Dexcom to improve glycemia , this was not pursued/approved    Health maintenance  she had diabetic retinopthay and ended up getting laser treatment she is s/p laser --- may 2020 got injection for retinopathy   -last microalb/creat ratio was 97.7>> 43>>78 in 8/2019 abnormal labs discussed in detail with the patient   has nephropathy aware of that ---she now follows with nephrologist   -foot exam peripheral neuropathy follows with a neurologist and is on lyrica and gabapentin.& __ left foot hammer toe infection healed after tendon release in march 2018   Follows with podiatry every 3 months last isit in 2019     - she has one [facility] level which was not normal so I ordred 24 hr urine cortisol and 1 mg dex supression which pt never got done ---      --- Essential hypertension and congestive heart failure  Stable follows with Dr Hema Terrell   She has been admitted multiple times with decompensated heart failure      ---- Mixed hyperlipidemia  HYPERTRIGLYCERIDEMIA level was 621>. 231 >>441>>336>>1089>>300 >>196  High pancreatitis risk I gave her printed hanout very low fat diet as well starting Lopid with crestor ---Sideeffects are more with combo with statins so she is asked to have repeat labs in a month   Advised to take insulin as well as started gemfibrozil ---pravachol 40      --- Class 3 obesity due to excess calories with serious comorbidity in adult, unspecified BMI  Weight stable   GASTRIC SLEEVE SURGERY planned for a few months     CAD s/p angioplasty   Follows with cardiology   Did cardiac rehab   Heart cath in oct 2017     Vit D def level was 14.3 improved to mid 26 >>23.4   patient is not compliant with vitamin D    DEXA scan 2015 was normal   Repeat in September 2018 had normal bone mineral density    PSYCH ISSUES /BIPOLAR DISORDER   on remeron   Follows with therapist       MNG   largest nodule in rt lobe 9 mm ---stable last  imaging in June 2017 stable shows MNG   She was given orders for repeat imaging which were not done, the orders are given again today with this visit    HX OF HYPOTHYROIDISM   -she is noted to be euthyroid without taking her LT4 for years       Reviewed and/or ordered clinical lab results Yes  Reviewed and/or ordered radiology tests Yes  Reviewed and/or ordered other diagnostic tests Yes  Made a decision to obtain old records Yes  Reviewed and summarized old records Yes      Matti Rowe was counseled regarding symptoms of current diagnosis, course and complications of disease if inadequately treated, side effects of medications, diagnosis, treatment options, and prognosis, risks, benefits, complications, and alternatives of treatment, labs, imaging and other studies and treatment targets and goals. She understands instructions and counseling. This was a phone conversation in [place] of in-person visit due to coronavirus emergency. Patient provided verbal consent for an \"over the phone visit'. Location of patient; home  Total time spent 23  minutes on this call conducting an interview, collecting data and reviewing her chart, performing a limited exam by phone and educating the patient on my assessment and plan.       These diagnosis were discussed and reviewed with the patient including the advantages of drug therapy. She was counseled at this visit on the following: diabetes complication prevention and foot care. Return in about 3 months (around 10/21/2020).

## 2020-08-20 ENCOUNTER — TELEPHONE (OUTPATIENT)
Dept: ENDOCRINOLOGY | Age: 65
End: 2020-08-20

## 2020-10-08 ENCOUNTER — TELEPHONE (OUTPATIENT)
Dept: ENDOCRINOLOGY | Age: 65
End: 2020-10-08

## 2020-10-08 NOTE — TELEPHONE ENCOUNTER
Left message for patient to call office back. [facility]  [address] [place] [address]  [address] [place] [address]  Dept: [phone]  6/24/2019      Gwen Barnes.  [id number]  [id number]  1991  female        The patient is here for month SWL #8 of their pre-operative supervised weight loss visit. She had a loss of -3.6 lbs. Has partially met: 1) Weight loss goal met down -3.6 2) Met journaling goal for 5 days per week. Patient is working on eliminating fast foods, fried foods, sweets and soda.  Gwen Barnes has been increasing her daily water intake. She has been or not been exercising: walk daily.    Patient states they have made positive changes including able to journal food 5 days per week and lost weight.  The patient admits to be struggling with sleeping as much as she should.    Particular food craving    Review of Systems  Vitals:    06/24/19 1553   BP: 129/77   Pulse: 77     There is no problem list on file for this patient.    Body mass index is 47.26 kg/m².  Documented weights    06/24/19 1553   Weight: (!) 145 kg (320 lb)     Weight change from last appointment:-3.6 lbs.   Weight change overall:+4.8 lbs.     Goals:  1) Contine drinking and eating separately, 2) Continue exercise, 3) Journaling foor 5 days per week,     Discussion/Plan:  Obesity/Morbid Obesity: Currently the patient's weight is decreasing. I reviewed the appropriate dietary choices with the patient and encouraged the necessary changes. Recommended at least 60 grams of protein per day, around 33 grams of fats and less than 100 grams of carbohydrates. Reviewed calorie intake if patient wanted to calorie count and/or had BMR. Instructed patient to drink 64 ounces of water per day and exercise a minimum of 150 minutes per week including both cardio and strength training.  Discussed with pt also eating and drinking separately stopping 30 minutes before meals and 45 minutes after meals.  Discussed the option of keeping a food journal which will help patient become more aware of the nutritional value of foods so they are more prepared after surgery.    The patient was given written materials from our office for education.   I answered all of the patients questions regarding dietary changes, exercise or surgical options.  No future appointments.  The patient will follow up in one month on.    The total time spent face to face was 20 minutes with 20 minutes spent counseling.

## 2020-10-08 NOTE — TELEPHONE ENCOUNTER
Patient has been hospitalized for over a month with renal failure. Patient also had a gastric sleeve placed. Patient is currently in the rehab center and said her sugar is in the 200's and she wanted to know what to do about her blood sugars. Patient also stated they are only checking her sugars BID and she normally checks them four times daily. She also just started a full liquid diet. She also states she has only been in the rehab center for 1 day. I explained that the nurse can call our office if there are any issues with sugars. The nurse should also contact the office with your diabetes orders as well as you sugar logs so that Dr. Nish Edmondson can make adjustments if needed. Patient stated she is going to have the nurse call the office.

## 2020-10-08 NOTE — TELEPHONE ENCOUNTER
Pt would like a call back    She was in the hospital and they \"messed with her insulin\"     She would like a call back

## 2020-10-13 ENCOUNTER — TELEPHONE (OUTPATIENT)
Dept: ENT CLINIC | Age: 65
End: 2020-10-13

## 2020-10-13 NOTE — TELEPHONE ENCOUNTER
Faxed order to [facility]. Mailed out AD on 09/20/18 Patient will return at next scheduled appointment.     Cooperative/Awake/Alert

## 2020-10-13 NOTE — TELEPHONE ENCOUNTER
Patient calling from [facility], rehab facility,      She want's dr Quyen Haley  To know that her blood sugar is 490,  Almost 500      She wood like a call  Back please

## 2020-10-13 NOTE — TELEPHONE ENCOUNTER
Patient aware of new orders and that they were faxed back to the rehab center. Patient to call office back if she continues to have issues with her sugars.

## 2020-10-13 NOTE — TELEPHONE ENCOUNTER
----Increase levemir to 30 units daily   ------novolog 20 units with each meal PLUS  ----Sliding Scale Insulin for short acting insulin   If BS > 120 -150 take 4 additional units of fast acting insulin   if --180 take 6units   181-210 take 8Units  211-240 take 10 Units   241--270 take 12Units   271- 300 take 14  Units

## 2020-11-09 ENCOUNTER — TELEPHONE (OUTPATIENT)
Dept: ENDOCRINOLOGY | Age: 65
End: 2020-11-09

## 2020-11-09 NOTE — TELEPHONE ENCOUNTER
Left Vm for patient that her lab orders have been faxed to [address] [place] [address] at [phone].

## 2020-11-09 NOTE — TELEPHONE ENCOUNTER
PT is requesting to have her lab orders faxed to [address] [place] [address]. She would also like to have a lipid panel and a Vitamin D lab order to this. Call PT and advise when this has been sent.    #[phone]

## 2020-11-18 ENCOUNTER — VIRTUAL VISIT (OUTPATIENT)
Dept: ENDOCRINOLOGY | Age: 65
End: 2020-11-18
Payer: MEDICARE

## 2020-11-18 PROCEDURE — 99443 PR PHYS/QHP TELEPHONE EVALUATION 21-30 MIN: CPT | Performed by: INTERNAL MEDICINE

## 2020-11-18 RX ORDER — PEN NEEDLE, DIABETIC 31 GX5/16"
NEEDLE, DISPOSABLE MISCELLANEOUS
Qty: 200 EACH | Refills: 5 | Status: SHIPPED | OUTPATIENT
Start: 2020-11-18 | End: 2021-02-08

## 2020-11-18 RX ORDER — GLUCOSAMINE HCL/CHONDROITIN SU 500-400 MG
CAPSULE ORAL
Qty: 600 STRIP | Refills: 5 | Status: SHIPPED | OUTPATIENT
Start: 2020-11-18

## 2020-11-18 RX ORDER — INSULIN DETEMIR 100 [IU]/ML
INJECTION, SOLUTION SUBCUTANEOUS
Qty: 60 PEN | Refills: 2 | Status: SHIPPED | OUTPATIENT
Start: 2020-11-18 | End: 2021-09-09 | Stop reason: SDUPTHER

## 2020-11-18 RX ORDER — METOPROLOL SUCCINATE 25 MG/1
25 TABLET, EXTENDED RELEASE ORAL NIGHTLY
COMMUNITY
Start: 2020-11-05

## 2020-11-18 RX ORDER — FUROSEMIDE 80 MG
40 TABLET ORAL DAILY PRN
COMMUNITY
Start: 2020-11-05 | End: 2021-09-09

## 2020-11-18 RX ORDER — INSULIN ASPART 100 [IU]/ML
INJECTION, SOLUTION INTRAVENOUS; SUBCUTANEOUS
Qty: 50 PEN | Refills: 4 | Status: SHIPPED | OUTPATIENT
Start: 2020-11-18 | End: 2021-09-09 | Stop reason: SDUPTHER

## 2020-11-18 NOTE — PROGRESS NOTES
Amanda Marques is a 60 y.o. female  seen for the management of uncontrolled Type 2 diabetes, hyperlipidemia and obesity  . Pt has T2DM which is uncontrolled and is complicated by severe peripheral neuropathy ,retinopathy and nephropathy. Pt was diagnosed in year 1995 when she was being evaluated for hypertriglyceridemia GEORGE has been very noncompliant she admits that her depression contributes to her noncompliance   Carol Elder has been obese most of her adult life  and she doesn't follow any dietry restriction. -- she has CAD s/p  stents 2016  And has CHF as well follows with Dr Kathleen Hugo     Her fingerstick blood glucose  improved when  compliance improved in oct/nov 2017 but she stopped taking insulin regularly again and her A1c worsened  Finally she has started taking insulin more often and started checking her numbers and some of her fingerstick blood glucose were in the 100 range but she went back to her noncompliance after a few months of better control     ---she was hospitalized for CHF worsening and renal insufficiency in jan 2020 and then again in august 2020   INTERIM:    Diabetes   She presents for her follow-up diabetic visit. She has type 2 diabetes mellitus. No MedicAlert identification noted. The initial diagnosis of diabetes was made 23 years ago. Her disease course has been stable. Hypoglycemia symptoms include confusion, nervousness/anxiousness, sweats and tremors. Associated symptoms include weakness and weight loss. Pertinent negatives for diabetes include no chest pain and no fatigue. There are no hypoglycemic complications. Symptoms are stable. Diabetic complications include a CVA, heart disease, peripheral neuropathy and retinopathy. Risk factors for coronary artery disease include diabetes mellitus, dyslipidemia, family history and hypertension. She is compliant with treatment most of the time. Her weight is decreasing steadily. She is following a diabetic and low salt diet.  Meal planning includes avoidance of concentrated sweets and carbohydrate counting. She has had a previous visit with a dietitian. She rarely participates in exercise. Her home blood glucose trend is decreasing steadily. Her breakfast blood glucose is taken between 8-9 am. Her breakfast blood glucose range is generally 130-140 mg/dl. Her lunch blood glucose is taken between 12-1 pm. Her lunch blood glucose range is generally 130-140 mg/dl. Her dinner blood glucose is taken between 6-7 pm. Her dinner blood glucose range is generally 140-180 mg/dl. An ACE inhibitor/angiotensin II receptor blocker is being taken. She sees a podiatrist.Eye exam is current.        She was  Hospitalized for 4 weeks and had to undergo dialysis , she was in the rehab for 3 weeks and during that stay she ended up getting   gastric sleeve   She was taking 40 units of levemir in the rehab   fastings are low 100   She takes 2-3 units of regular   She has lost weight after her gastric sleeve surgery in oct 2020 , she is not having any cravings and is taking less insulin   Now weighs 235 lbs       Weight trend: stable  Prior visit with dietician: yes  Current diet: on average, 3 meals per day  Current exercise: rare    Past Medical History:   Diagnosis Date    Abdominal pain, right upper quadrant 54754128    Abnormal weight gain 5/3/2010    Bell's palsy [id number]    Benign intracranial hypertension 5/3/2010    Class 3 severe obesity due to excess calories with serious comorbidity and body mass index (BMI) of 45.0 to 49.9 in adult ([facility] 1/22/2019    Depressive disorder, not elsewhere classified 5/3/2010    Dizziness and giddiness     Edema     Esophageal reflux 5/3/2010    Fibromyalgia     Gastroparesis     Lumbago     Mononeuritis of unspecified site 5/3/2010    Onychia and paronychia of toe [id number]    Other and unspecified hyperlipidemia 5/3/2010    Other nonspecific abnormal serum enzyme levels 5/3/2010    Other nonspecific abnormal serum enzyme levels [id number]    Pain in joint, pelvic region and thigh [id number]    Type II or unspecified type diabetes mellitus with neurological manifestations, not stated as uncontrolled(250.60) 5/3/2010    Type II or unspecified type diabetes mellitus without mention of complication, not stated as uncontrolled 5/3/2010    Unspecified essential hypertension 5/3/2010    Unspecified vitamin D deficiency 5/3/2010      Patient Active Problem List   Diagnosis    Vitamin D deficiency    Essential hypertension    Other and unspecified hyperlipidemia    Mononeuritis    Esophageal reflux    Depressive disorder, not elsewhere classified    Benign intracranial hypertension    Fibromyalgia    Uncontrolled type 2 diabetes mellitus with complication, with long-term current use of insulin (HCC)    Proteinuria    Hypercalcemia    Arthritis    Class 3 severe obesity due to excess calories with serious comorbidity and body mass index (BMI) of 45.0 to 49.9 in adult [facility])    Coronary artery disease involving native coronary artery of native heart without angina pectoris     Past Surgical History:   Procedure Laterality Date    CATARACT REMOVAL Bilateral 2018    GYNECOLOGIC CRYOSURGERY      D&C    HYSTERECTOMY, TOTAL ABDOMINAL      SHOULDER SURGERY      WRIST SURGERY       Social History     Socioeconomic History    Marital status: Single     Spouse name: Not on file    Number of children: Not on file    Years of education: Not on file    Highest education level: Not on file   Occupational History    Not on file   Social Needs    Financial resource strain: Not on file    Food insecurity     Worry: Not on file     Inability: Not on file    Transportation needs     Medical: Not on file     Non-medical: Not on file   Tobacco Use    Smoking status: Former Smoker    Smokeless tobacco: Never Used   Substance and Sexual Activity    Alcohol use: No    Drug use: No    Sexual activity: Not on file   Lifestyle    Physical activity     Days per week: Not on file     Minutes per session: Not on file    Stress: Not on file   Relationships    Social connections     Talks on phone: Not on file     Gets together: Not on file     Attends Hoahaoism service: Not on file     Active member of club or organization: Not on file     Attends meetings of clubs or organizations: Not on file     Relationship status: Not on file    Intimate partner violence     Fear of current or ex partner: Not on file     Emotionally abused: Not on file     Physically abused: Not on file     Forced sexual activity: Not on file   Other Topics Concern    Not on file   Social History Narrative    Not on file     Family History   Problem Relation Age of Onset    Cancer Mother     Diabetes Mother     High Blood Pressure Mother     Cancer Father     Diabetes Father     High Blood Pressure Father      Current Outpatient Medications   Medication Sig Dispense Refill    furosemide (LASIX) 80 MG tablet Take 80 mg by mouth daily as needed      metoprolol succinate (TOPROL XL) 25 MG extended release tablet Take 25 mg by mouth nightly      insulin aspart (NOVOLOG FLEXPEN) 100 UNIT/ML injection pen Take 80 units three times daily 50 pen 4    insulin detemir (LEVEMIR FLEXTOUCH) 100 UNIT/ML injection pen INJECT 95 UNITS UNDER THE SKIN TWO TIMES A DAY 60 pen 2    Insulin Pen Needle (B-D UF III MINI PEN NEEDLES) 31G X 5 MM MISC Use 6 times a day 200 each 5    blood glucose monitor strips Test blood sugar 6 times per day, one touch verio flex test strip 600 strip 5    Blood Glucose Monitoring Suppl (ONETOUCH VERIO FLEX SYSTEM) w/Device KIT Check blood sugar 1 kit 0    diazepam (VALIUM) 5 MG tablet Take 5 mg by mouth 2 times daily as needed for Anxiety. Contreras Naik doxepin (SINEQUAN) 50 MG capsule Take 100 mg by mouth nightly      aspirin 81 MG tablet Take 81 mg by mouth      pravastatin (PRAVACHOL) 80 MG tablet Take 80 mg by mouth      HYDROcodone-acetaminophen (NORCO) 5-325 MG per tablet Take 5-250 tablets by mouth every 6 hours as needed for Pain.  citalopram (CELEXA) 20 MG tablet Take 40 mg by mouth daily       Erythromycin 2 % OINT Apply topically as needed       gabapentin (NEURONTIN) 600 MG tablet Take 300 mg by mouth 3 times daily.  pregabalin (LYRICA) 200 MG capsule Take 200 mg by mouth. Pt takes BID      LamoTRIgine (LAMICTAL XR) 200 MG TB24 Take 200 mg by mouth daily        No current facility-administered medications for this visit. Allergies   Allergen Reactions    Lipitor     Sumatriptan      Family Status   Relation Name Status    Mother  Alive   Ervin Lai Father      Sister  Alive       Review of Systems  Constitutional  Patient denies any weight loss denies any weight gain,  Eyes   Pt denies any double vision blurred vision or decreased peripheral vision  Head ear nose throat  Denies any trouble swallowing denies any hoarseness  Denies any shortness of breath denies  Cardiovascular  Denies any chest pain denies any palpitations currently  Gastrointestinal  Denies any nausea ,vomiting ,constipation or diarrhea  Hematological/lymphatic  Denies any blood clot denies or any painful lymph nodes  Genitourinary  Denies any frequent urination denies any nighttime urination  Skin  Denies any acne denies any changes in facial body hair denies any non healing wounds Musculoskeletal   denies any joint or bone pain ,denies any muscle weakness ,denies any new fracture  Endocrine  Denies any excessive thirst denies any excessive heat intolerance or cold intolerance . OBJECTIVE:  There were no vitals taken for this visit. Wt Readings from Last 3 Encounters:   20 255 lb (115.7 kg)   19 271 lb (122.9 kg)   19 268 lb (121.6 kg)       Patient is  alert oriented to time ,place and person. Both recent and remote memory intact .   Patient has weighed themselves in the last few  weeks and it has been stable   Weight 235  lbs         Lab Results   Component Value Date    LABA1C 6.9 11/13/2020    LABA1C 8.9 01/09/2020    LABA1C 11.3 11/14/2019         ASSESSMENT/PLAN:  --- Type 2 diabetes mellitus with complication, with long-term current use of insulin ---10.5>>11.1>>9.7>>8.5>>10.5>>11.0 >>11.3 >>9.5 >>9.4 >>10.0>>11.3>>10.3>>10.8>>10.4>>9.9>>11.9>>10.8>>9.9 >>8.5>>7.2>>9.1>>9.9>>11.3>>9.1>>11.3>>8.9>>6.9    aic has improved since last visit due to weight loss after gastric sleeve   ----She takes 40  units of Levemir and her fasting are running in the 90---110 range   ---Novolog 2-3  units + with each meal ---  Gastric sleeve in Oct 2020 lost 20 --40 lbs   --Glipizide stopped in May 2019   --synjardy ( pt couldn't tolerate SE stopped it  Due to yeast infection )   ---started trulicity in may 4713 stopped since surgery      She checks her fingerstick glucose 4-5 times daily  Discussed getting Dexcom to improve glycemia , this was not pursued/approved    Health maintenance  she had diabetic retinopthay and ended up getting laser treatment she is s/p laser --- may 2020 got injection for retinopathy   -last microalb/creat ratio was 97.7>> 43>>78 in 8/2019 abnormal labs discussed in detail with the patient   has nephropathy aware of that ---she now follows with nephrologist   -foot exam peripheral neuropathy follows with a neurologist and is on lyrica and gabapentin.& __ left foot hammer toe infection healed after tendon release in march 2018   Follows with podiatry every 3 months last isit in 2019     - she has one [address] level which was not normal so I ordred 24 hr urine cortisol and 1 mg dex supression which pt never got done ---    OBESITY s/p gastric sleeve in oct 2020   She has lost 30--40 lbs since surgery   She is still losing 1 lb week    --- Essential hypertension and congestive heart failure  Stable follows with Dr Tamika Mello   She has been admitted multiple times with decompensated heart failure  Since her sleeve surgery she has taken lasix only 3 times       ---- Mixed hyperlipidemia  HYPERTRIGLYCERIDEMIA level was 621>. 231 >>441>>336>>1089>>300 >>196  She stopped gemfibrozil since hospital stay   Pills is too big   She will stay off and will monitor level    ---pravachol 40        CAD s/p angioplasty   Follows with cardiology   Did cardiac rehab   Heart cath in oct 2017     Vit D def level was 14.3 improved to mid 26 >>23.4   patient is not compliant with vitamin D    DEXA scan 2015 was normal   Repeat in September 2018 had normal bone mineral density    PSYCH ISSUES /BIPOLAR DISORDER   on remeron   Follows with therapist       MNG   largest nodule in rt lobe 9 mm ---stable last  imaging in June 2017 stable shows MNG   She was given orders for repeat imaging which were not done, the orders are given again today with this visit    HX OF HYPOTHYROIDISM   -she is noted to be euthyroid without taking her LT4 for years       Reviewed and/or ordered clinical lab results Yes  Reviewed and/or ordered radiology tests Yes  Reviewed and/or ordered other diagnostic tests Yes  Made a decision to obtain old records Yes  Reviewed and summarized old records Yes      Juvenal Platt was counseled regarding symptoms of current diagnosis, course and complications of disease if inadequately treated, side effects of medications, diagnosis, treatment options, and prognosis, risks, benefits, complications, and alternatives of treatment, labs, imaging and other studies and treatment targets and goals. She understands instructions and counseling. This was a phone conversation in [place] of in-person visit due to coronavirus emergency. Patient provided verbal consent for an \"over the phone visit'. Location of patient; home  Total time spent 23  minutes on this call conducting an interview, collecting data and reviewing her chart, performing a limited exam by phone and educating the patient on my assessment and plan.       These diagnosis were discussed and reviewed with the patient including the advantages of drug therapy. She was counseled at this visit on the following: diabetes complication prevention and foot care. Return in about 3 months (around 2/18/2021).

## 2021-01-18 ENCOUNTER — TELEPHONE (OUTPATIENT)
Dept: ENDOCRINOLOGY | Age: 66
End: 2021-01-18

## 2021-01-20 NOTE — TELEPHONE ENCOUNTER
1st return request from [place].  We did receive chart notes however we are missing:  -evidence that the beneficiary is using a BGM to check blood glucose 4 or more times daily  -evidence that the beneficiarys insulin regimen requires frequent adjustment based of blood glucose readings

## 2021-02-25 ENCOUNTER — VIRTUAL VISIT (OUTPATIENT)
Dept: ENDOCRINOLOGY | Age: 66
End: 2021-02-25
Payer: MEDICARE

## 2021-02-25 DIAGNOSIS — E66.01 CLASS 3 SEVERE OBESITY DUE TO EXCESS CALORIES WITH SERIOUS COMORBIDITY AND BODY MASS INDEX (BMI) OF 45.0 TO 49.9 IN ADULT (HCC): ICD-10-CM

## 2021-02-25 DIAGNOSIS — E78.2 MIXED HYPERLIPIDEMIA: ICD-10-CM

## 2021-02-25 DIAGNOSIS — I10 ESSENTIAL HYPERTENSION: ICD-10-CM

## 2021-02-25 PROCEDURE — 3046F HEMOGLOBIN A1C LEVEL >9.0%: CPT | Performed by: INTERNAL MEDICINE

## 2021-02-25 PROCEDURE — 3017F COLORECTAL CA SCREEN DOC REV: CPT | Performed by: INTERNAL MEDICINE

## 2021-02-25 PROCEDURE — 2022F DILAT RTA XM EVC RTNOPTHY: CPT | Performed by: INTERNAL MEDICINE

## 2021-02-25 PROCEDURE — 99214 OFFICE O/P EST MOD 30 MIN: CPT | Performed by: INTERNAL MEDICINE

## 2021-02-25 PROCEDURE — G8427 DOCREV CUR MEDS BY ELIG CLIN: HCPCS | Performed by: INTERNAL MEDICINE

## 2021-02-25 NOTE — PROGRESS NOTES
Phoebe Freeman is a 60 y.o. female  seen for the management of uncontrolled Type 2 diabetes, hyperlipidemia and obesity  . Pt has T2DM which is uncontrolled and is complicated by severe peripheral neuropathy ,retinopathy and nephropathy. Pt was diagnosed in year 1995 when she was being evaluated for hypertriglyceridemia GEORGE has been very noncompliant she admits that her depression contributes to her noncompliance   Andabiola Soliman has been obese most of her adult life  and she doesn't follow any dietry restriction. -- she has CAD s/p  stents 2016  And has CHF as well follows with Dr Pato Espino     Her fingerstick blood glucose  improved when  compliance improved in oct/nov 2017 but she stopped taking insulin regularly again and her A1c worsened  Finally she has started taking insulin more often and started checking her numbers and some of her fingerstick blood glucose were in the 100 range but she went back to her noncompliance after a few months of better control     ---she was hospitalized for CHF worsening and renal insufficiency in jan 2020 and then again in august 2020   INTERIM:    Diabetes  She presents for her follow-up diabetic visit. She has type 2 diabetes mellitus. No MedicAlert identification noted. The initial diagnosis of diabetes was made 23 years ago. Her disease course has been stable. Hypoglycemia symptoms include confusion, nervousness/anxiousness, sweats and tremors. Associated symptoms include weakness and weight loss. Pertinent negatives for diabetes include no chest pain and no fatigue. There are no hypoglycemic complications. Symptoms are stable. Diabetic complications include a CVA, heart disease, peripheral neuropathy and retinopathy. Risk factors for coronary artery disease include diabetes mellitus, dyslipidemia, family history and hypertension. She is compliant with treatment most of the time. Her weight is decreasing steadily. She is following a diabetic and low salt diet.  Meal planning includes avoidance of concentrated sweets and carbohydrate counting. She has had a previous visit with a dietitian. She rarely participates in exercise. Her home blood glucose trend is decreasing steadily. Her breakfast blood glucose is taken between 8-9 am. Her breakfast blood glucose range is generally 130-140 mg/dl. Her lunch blood glucose is taken between 12-1 pm. Her lunch blood glucose range is generally 130-140 mg/dl. Her dinner blood glucose is taken between 6-7 pm. Her dinner blood glucose range is generally 140-180 mg/dl. An ACE inhibitor/angiotensin II receptor blocker is being taken. She sees a podiatrist.Eye exam is current.      She has lost another 15 lbs   She has been using 8--10 units of short acting   Basal insulin 40 units   She has lost lost almost 50 + lbs   She was  Hospitalized for 4 weeks and had to undergo dialysis , she was in the rehab for 3 weeks and during that stay she ended up getting   gastric sleeve   She was taking 40 units of levemir in the rehab   fastings are low 100   She takes 2-3 units of regular   She has lost weight after her gastric sleeve surgery in oct 2020 , she is not having any cravings and is taking less insulin   Now weighs 235 lbs       Weight trend: stable  Prior visit with dietician: yes  Current diet: on average, 3 meals per day  Current exercise: rare    Past Medical History:   Diagnosis Date    Abdominal pain, right upper quadrant [id number]    Abnormal weight gain 5/3/2010    Bell's palsy [id number]    Benign intracranial hypertension 5/3/2010    Class 3 severe obesity due to excess calories with serious comorbidity and body mass index (BMI) of 45.0 to 49.9 in adult [facility] 1/22/2019    Depressive disorder, not elsewhere classified 5/3/2010    Dizziness and giddiness     Edema     Esophageal reflux 5/3/2010    Fibromyalgia     Gastroparesis     Lumbago     Mononeuritis of unspecified site 5/3/2010    Onychia and paronychia of toe [id number]    Other and unspecified hyperlipidemia 5/3/2010    Other nonspecific abnormal serum enzyme levels 5/3/2010    Other nonspecific abnormal serum enzyme levels [id number]    Pain in joint, pelvic region and thigh [id number]    Type II or unspecified type diabetes mellitus with neurological manifestations, not stated as uncontrolled(250.60) 5/3/2010    Type II or unspecified type diabetes mellitus without mention of complication, not stated as uncontrolled 5/3/2010    Unspecified essential hypertension 5/3/2010    Unspecified vitamin D deficiency 5/3/2010      Patient Active Problem List   Diagnosis    Vitamin D deficiency    Essential hypertension    Other and unspecified hyperlipidemia    Mononeuritis    Esophageal reflux    Depressive disorder, not elsewhere classified    Benign intracranial hypertension    Fibromyalgia    Uncontrolled type 2 diabetes mellitus with complication, with long-term current use of insulin ([facility])    Proteinuria    Hypercalcemia    Arthritis    Class 3 severe obesity due to excess calories with serious comorbidity and body mass index (BMI) of 45.0 to 49.9 in adult [facility])    Coronary artery disease involving native coronary artery of native heart without angina pectoris     Past Surgical History:   Procedure Laterality Date    CATARACT REMOVAL Bilateral 2018    GYNECOLOGIC CRYOSURGERY      D&C    HYSTERECTOMY, TOTAL ABDOMINAL      SHOULDER SURGERY      WRIST SURGERY       Social History     Socioeconomic History    Marital status: Single     Spouse name: Not on file    Number of children: Not on file    Years of education: Not on file    Highest education level: Not on file   Occupational History    Not on file   Social Needs    Financial resource strain: Not on file    Food insecurity     Worry: Not on file     Inability: Not on file    Transportation needs     Medical: Not on file     Non-medical: Not on file   Tobacco Use    Smoking status: Former Smoker    Smokeless tobacco: Never Used   Substance and Sexual Activity    Alcohol use: No    Drug use: No    Sexual activity: Not on file   Lifestyle    Physical activity     Days per week: Not on file     Minutes per session: Not on file    Stress: Not on file   Relationships    Social connections     Talks on phone: Not on file     Gets together: Not on file     Attends Voodoo service: Not on file     Active member of club or organization: Not on file     Attends meetings of clubs or organizations: Not on file     Relationship status: Not on file    Intimate partner violence     Fear of current or ex partner: Not on file     Emotionally abused: Not on file     Physically abused: Not on file     Forced sexual activity: Not on file   Other Topics Concern    Not on file   Social History Narrative    Not on file     Family History   Problem Relation Age of Onset    Cancer Mother     Diabetes Mother     High Blood Pressure Mother     Cancer Father     Diabetes Father     High Blood Pressure Father      Current Outpatient Medications   Medication Sig Dispense Refill    Multiple Vitamin (MULTIVITAMIN ADULT PO) Take by mouth      Ferrous Sulfate (IRON PO) Take by mouth      VITAMIN D PO Take by mouth      CALCIUM CITRATE PO Take by mouth      Insulin Pen Needle (B-D UF III MINI PEN NEEDLES) 31G X 5 MM MISC USE FIVE TIMES DAILY 400 each 5    furosemide (LASIX) 80 MG tablet Take 40 mg by mouth daily as needed       metoprolol succinate (TOPROL XL) 25 MG extended release tablet Take 25 mg by mouth nightly      insulin aspart (NOVOLOG FLEXPEN) 100 UNIT/ML injection pen Take 80 units three times daily 50 pen 4    insulin detemir (LEVEMIR FLEXTOUCH) 100 UNIT/ML injection pen INJECT 95 UNITS UNDER THE SKIN TWO TIMES A DAY 60 pen 2    blood glucose monitor strips Test blood sugar 6 times per day, one touch verio flex test strip 600 strip 5    Blood Glucose Monitoring Suppl (ONETOUCH VERIO FLEX SYSTEM) w/Device KIT Check blood sugar 1 kit 0    diazepam (VALIUM) 5 MG tablet Take 5 mg by mouth 2 times daily as needed for Anxiety. Mitzy Osman doxepin (SINEQUAN) 50 MG capsule Take 100 mg by mouth nightly      aspirin 81 MG tablet Take 81 mg by mouth      pravastatin (PRAVACHOL) 80 MG tablet Take 80 mg by mouth      HYDROcodone-acetaminophen (NORCO) 5-325 MG per tablet Take 5-250 tablets by mouth every 6 hours as needed for Pain.  citalopram (CELEXA) 20 MG tablet Take 40 mg by mouth daily       Erythromycin 2 % OINT Apply topically as needed       gabapentin (NEURONTIN) 600 MG tablet Take 300 mg by mouth 3 times daily.  pregabalin (LYRICA) 200 MG capsule Take 200 mg by mouth. Pt takes BID      LamoTRIgine (LAMICTAL XR) 200 MG TB24 Take 200 mg by mouth daily        No current facility-administered medications for this visit. Allergies   Allergen Reactions    Lipitor     Sumatriptan      Family Status   Relation Name Status    Mother  Alive    Father      Sister  Alive           OBJECTIVE:  There were no vitals taken for this visit.    Wt Readings from Last 3 Encounters:   20 255 lb (115.7 kg)   19 271 lb (122.9 kg)   19 268 lb (121.6 kg)         Constitutional: no acute distress, well appearing and well nourished  Psychiatric: oriented to person, place and time, judgement and insight and normal, recent and remote memory intact and mood and affect are normal  Skin: skin and subcutaneous tissue is normal without visible mass,   Head and Face: visual inspection  of head and face revealed no abnormalities  Eyes: visual inspection showed no lid or conjunctival swelling, erythema or discharge, pupils are normal, equal, round  Ears/Nose: external inspection of ears and nose revealed no abnormalities, hearing is grossly normal  Oropharynx/Mouth/Face: lips, tongue and gums appear  normal with no lesions, the voice quality was normal  Neck: neck appears symmetric, with no visible masses,   Pulmonary: no increased work of breathing or signs of respiratory distress,  Musculoskeletal: normal on inspection    Neurological: normal coordination and normal general cortical function          Lab Results   Component Value Date    LABA1C 6.9 11/13/2020    LABA1C 8.9 01/09/2020    LABA1C 11.3 11/14/2019         ASSESSMENT/PLAN:  --- Type 2 diabetes mellitus with complication, with long-term current use of insulin ---10.5>>11.1>>9.7>>8.5>>10.5>>11.0 >>11.3 >>9.5 >>9.4 >>10.0>>11.3>>10.3>>10.8>>10.4>>9.9>>11.9>>10.8>>9.9 >>8.5>>7.2>>9.1>>9.9>>11.3>>9.1>>11.3>>8.9>>6.9    aic has improved since last visit due to weight loss after gastric sleeve   ----She takes 40  units of Levemir and her fasting are running in the 90---110 range   ---Novolog 8  units + with each meal ---  Gastric sleeve in Oct 2020 lost 50+ lbs weight loss so far,  Sometimes is not compliant with diet but will work on that  --Glipizide stopped in May 2019   --synjardy ( pt couldn't tolerate SE stopped it  Due to yeast infection )   ---started trulicity in may 9641 stopped since surgery      She checks her fingerstick glucose 4-5 times daily  Discussed getting Dexcom to improve glycemia , this was not pursued/approved    Health maintenance  she had diabetic retinopthay and ended up getting laser treatment she is s/p laser --- may 2020 got injection for retinopathy   -last microalb/creat ratio was 97.7>> 43>>78 in 8/2019 abnormal labs discussed in detail with the patient   has nephropathy aware of that ---she now follows with nephrologist   -foot exam peripheral neuropathy follows with a neurologist and is on lyrica and gabapentin.& __ left foot hammer toe infection healed after tendon release in march 2018   Follows with podiatry every 3 months last isit in 2019     - she has one [address] level which was not normal so I ordred 24 hr urine cortisol and 1 mg dex supression which pt never got done ---    OBESITY s/p gastric sleeve in oct 2020   She has lost 30--40 lbs since surgery   She is still losing 1 lb week    --- Essential hypertension and congestive heart failure  Stable follows with Dr Jacob Daniel   She has been admitted multiple times with decompensated heart failure  Since her sleeve surgery she has taken lasix only 3 times       ---- Mixed hyperlipidemia  HYPERTRIGLYCERIDEMIA level was 621>. 231 >>441>>336>>1089>>300 >>196  She stopped gemfibrozil since hospital stay   Pills is too big for her to swallow  She will stay off and will monitor level    ---pravachol 40        CAD s/p angioplasty   Follows with cardiology   Did cardiac rehab   Heart cath in oct 2017     Vit D def level was 14.3 improved to mid 26 >>23.4   patient is not compliant with vitamin D    DEXA scan 2015 was normal   Repeat in September 2018 had normal bone mineral density    PSYCH ISSUES /BIPOLAR DISORDER   on remeron   Follows with therapist       LISSETTE   largest nodule in rt lobe 9 mm ---stable last  imaging in June 2017 stable shows MNG   She was given orders for repeat imaging which were not done, the orders are given again today with this visit    HX OF HYPOTHYROIDISM   -she is noted to be euthyroid without taking her LT4 for years       Reviewed and/or ordered clinical lab results Yes  Reviewed and/or ordered radiology tests Yes  Reviewed and/or ordered other diagnostic tests Yes  Made a decision to obtain old records Yes  Reviewed and summarized old records Yes      Jaskarandelfina Mitchell was counseled regarding symptoms of current diagnosis, course and complications of disease if inadequately treated, side effects of medications, diagnosis, treatment options, and prognosis, risks, benefits, complications, and alternatives of treatment, labs, imaging and other studies and treatment targets and goals. She understands instructions and counseling.     TELEHEALTH EVALUATION -- Audio/Visual (During WRYMM-02 public health emergency)  Pursuant to the emergency declaration under the [address] Emergencies Act, 1135 waiver authority and the Coronavirus Preparedness and Response Supplemental Appropriations Act, this Virtual  Visit was conducted, with patient's consent, to reduce the patient's risk of exposure to COVID-19 and provide care for  patient. Services were provided through a video synchronous discussion virtually to substitute for in-person clinic visit. Patient's location : home     Patient provided verbal consent to use the video visit. Total time spent : 30+Reviewing the chart, conducting an interview, performing a limited exam by video and educating the patient on my assessment plan. These diagnosis were discussed and reviewed with the patient including the advantages of drug therapy. She was counseled at this visit on the following: diabetes complication prevention and foot care. No follow-ups on file.

## 2021-02-25 NOTE — Clinical Note
Please schedule patient a follow-up in 3 months and mail patient lab orders for the next appointment for her blood work orders which are dated 4 November can you please fax them to you see the patient is going there now and also her mom Trang Sousa GFR OE or ER please fax her orders to you see as well they both will be there and 1 or 2 hours

## 2021-02-25 NOTE — PROGRESS NOTES
Appt scheduled. Lab orders mailed and then labs for now faxed to [facility] for patient and her mother.

## 2021-05-13 ENCOUNTER — TELEPHONE (OUTPATIENT)
Dept: ENDOCRINOLOGY | Age: 66
End: 2021-05-13

## 2021-05-13 NOTE — TELEPHONE ENCOUNTER
Fax from [facility] regarding a follow up eye exam for moderate NPDR that was done on 5/13/21

## 2021-09-09 ENCOUNTER — OFFICE VISIT (OUTPATIENT)
Dept: ENDOCRINOLOGY | Age: 66
End: 2021-09-09
Payer: MEDICARE

## 2021-09-09 ENCOUNTER — TELEPHONE (OUTPATIENT)
Dept: ENDOCRINOLOGY | Age: 66
End: 2021-09-09

## 2021-09-09 VITALS
DIASTOLIC BLOOD PRESSURE: 59 MMHG | SYSTOLIC BLOOD PRESSURE: 141 MMHG | HEART RATE: 73 BPM | HEIGHT: 63 IN | RESPIRATION RATE: 16 BRPM | BODY MASS INDEX: 40.75 KG/M2 | WEIGHT: 230 LBS

## 2021-09-09 DIAGNOSIS — E66.01 CLASS 3 SEVERE OBESITY DUE TO EXCESS CALORIES WITH SERIOUS COMORBIDITY AND BODY MASS INDEX (BMI) OF 45.0 TO 49.9 IN ADULT (HCC): ICD-10-CM

## 2021-09-09 DIAGNOSIS — I10 ESSENTIAL HYPERTENSION: ICD-10-CM

## 2021-09-09 PROCEDURE — G8427 DOCREV CUR MEDS BY ELIG CLIN: HCPCS | Performed by: INTERNAL MEDICINE

## 2021-09-09 PROCEDURE — 2022F DILAT RTA XM EVC RTNOPTHY: CPT | Performed by: INTERNAL MEDICINE

## 2021-09-09 PROCEDURE — G8400 PT W/DXA NO RESULTS DOC: HCPCS | Performed by: INTERNAL MEDICINE

## 2021-09-09 PROCEDURE — 4040F PNEUMOC VAC/ADMIN/RCVD: CPT | Performed by: INTERNAL MEDICINE

## 2021-09-09 PROCEDURE — 1090F PRES/ABSN URINE INCON ASSESS: CPT | Performed by: INTERNAL MEDICINE

## 2021-09-09 PROCEDURE — 99215 OFFICE O/P EST HI 40 MIN: CPT | Performed by: INTERNAL MEDICINE

## 2021-09-09 PROCEDURE — 1123F ACP DISCUSS/DSCN MKR DOCD: CPT | Performed by: INTERNAL MEDICINE

## 2021-09-09 PROCEDURE — 3017F COLORECTAL CA SCREEN DOC REV: CPT | Performed by: INTERNAL MEDICINE

## 2021-09-09 PROCEDURE — 1036F TOBACCO NON-USER: CPT | Performed by: INTERNAL MEDICINE

## 2021-09-09 PROCEDURE — G8417 CALC BMI ABV UP PARAM F/U: HCPCS | Performed by: INTERNAL MEDICINE

## 2021-09-09 PROCEDURE — 3046F HEMOGLOBIN A1C LEVEL >9.0%: CPT | Performed by: INTERNAL MEDICINE

## 2021-09-09 RX ORDER — GLUCAGON 3 MG/1
POWDER NASAL
Qty: 2 EACH | Refills: 3 | Status: SHIPPED | OUTPATIENT
Start: 2021-09-09 | End: 2022-01-04

## 2021-09-09 RX ORDER — PEN NEEDLE, DIABETIC 31 GX5/16"
NEEDLE, DISPOSABLE MISCELLANEOUS
Qty: 400 EACH | Refills: 5 | Status: SHIPPED | OUTPATIENT
Start: 2021-09-09 | End: 2022-01-04 | Stop reason: SDUPTHER

## 2021-09-09 RX ORDER — POTASSIUM CHLORIDE 20 MEQ/1
20 TABLET, EXTENDED RELEASE ORAL DAILY
COMMUNITY
Start: 2021-09-07 | End: 2022-10-20

## 2021-09-09 RX ORDER — TORSEMIDE 20 MG/1
80 TABLET ORAL EVERY MORNING
COMMUNITY
Start: 2021-06-24

## 2021-09-09 RX ORDER — CEFDINIR 300 MG/1
CAPSULE ORAL
COMMUNITY
Start: 2021-08-22 | End: 2022-01-04

## 2021-09-09 RX ORDER — INSULIN DETEMIR 100 [IU]/ML
INJECTION, SOLUTION SUBCUTANEOUS
Qty: 60 PEN | Refills: 2 | Status: SHIPPED | OUTPATIENT
Start: 2021-09-09 | End: 2022-01-04 | Stop reason: SDUPTHER

## 2021-09-09 RX ORDER — NIFEDIPINE 30 MG/1
30 TABLET, FILM COATED, EXTENDED RELEASE ORAL NIGHTLY
COMMUNITY

## 2021-09-09 RX ORDER — INSULIN ASPART 100 [IU]/ML
INJECTION, SOLUTION INTRAVENOUS; SUBCUTANEOUS
Qty: 50 PEN | Refills: 4 | Status: SHIPPED | OUTPATIENT
Start: 2021-09-09 | End: 2022-01-04 | Stop reason: SDUPTHER

## 2021-09-09 RX ORDER — LINEZOLID 600 MG/1
600 TABLET, FILM COATED ORAL 2 TIMES DAILY
COMMUNITY
Start: 2021-09-06 | End: 2021-09-13

## 2021-09-09 NOTE — TELEPHONE ENCOUNTER
Fax from [facility].  A [employer] has been started for [employer]

## 2021-09-09 NOTE — PROGRESS NOTES
following a diabetic and low salt diet. Meal planning includes avoidance of concentrated sweets and carbohydrate counting. She has had a previous visit with a dietitian. She rarely participates in exercise. Her home blood glucose trend is decreasing steadily. Her breakfast blood glucose is taken between 8-9 am. Her breakfast blood glucose range is generally 130-140 mg/dl. Her lunch blood glucose is taken between 12-1 pm. Her lunch blood glucose range is generally 130-140 mg/dl. Her dinner blood glucose is taken between 6-7 pm. Her dinner blood glucose range is generally 140-180 mg/dl. An ACE inhibitor/angiotensin II receptor blocker is being taken. She sees a podiatrist.Eye exam is current.        In end of July she had issues with  Her Rt foot --she was in the hospital in [address] [place] [address] for Rt toe infection / amputation   She has been taking lantus 40 units BID   And novolog as per sliding scale       Weight trend: stable  Prior visit with dietician: yes  Current diet: on average, 3 meals per day  Current exercise: rare    Past Medical History:   Diagnosis Date    Abdominal pain, right upper quadrant [id number]    Abnormal weight gain 5/3/2010    Bell's palsy [id number]    Benign intracranial hypertension 5/3/2010    Class 3 severe obesity due to excess calories with serious comorbidity and body mass index (BMI) of 45.0 to 49.9 in adult ([facility] 1/22/2019    Depressive disorder, not elsewhere classified 5/3/2010    Dizziness and giddiness     Edema     Esophageal reflux 5/3/2010    Fibromyalgia     Gastroparesis     Lumbago     Mononeuritis of unspecified site 5/3/2010    Onychia and paronychia of toe [id number]    Other and unspecified hyperlipidemia 5/3/2010    Other nonspecific abnormal serum enzyme levels 5/3/2010    Other nonspecific abnormal serum enzyme levels [id number]    Pain in joint, pelvic region and thigh [id number]    Type II or unspecified type diabetes mellitus with neurological manifestations, not stated as uncontrolled(250.60) 5/3/2010    Type II or unspecified type diabetes mellitus without mention of complication, not stated as uncontrolled 5/3/2010    Unspecified essential hypertension 5/3/2010    Unspecified vitamin D deficiency 5/3/2010      Patient Active Problem List   Diagnosis    Vitamin D deficiency    Essential hypertension    Mixed hyperlipidemia    Mononeuritis    Esophageal reflux    Depressive disorder, not elsewhere classified    Benign intracranial hypertension    Fibromyalgia    Uncontrolled type 2 diabetes mellitus with complication, with long-term current use of insulin (HCC)    Proteinuria    Hypercalcemia    Arthritis    Class 3 severe obesity due to excess calories with serious comorbidity and body mass index (BMI) of 45.0 to 49.9 in adult [facility])    Coronary artery disease involving native coronary artery of native heart without angina pectoris     Past Surgical History:   Procedure Laterality Date    CATARACT REMOVAL Bilateral 2018    GYNECOLOGIC CRYOSURGERY      D&C    HYSTERECTOMY, TOTAL ABDOMINAL      SHOULDER SURGERY      WRIST SURGERY       Social History     Socioeconomic History    Marital status: Single     Spouse name: Not on file    Number of children: Not on file    Years of education: Not on file    Highest education level: Not on file   Occupational History    Not on file   Tobacco Use    Smoking status: Former Smoker    Smokeless tobacco: Never Used   Vaping Use    Vaping Use: Never used   Substance and Sexual Activity    Alcohol use: No    Drug use: No    Sexual activity: Not on file   Other Topics Concern    Not on file   Social History Narrative    Not on file     Social Determinants of Health     Financial Resource Strain:     Difficulty of Paying Living Expenses:    Food Insecurity:     Worried About Running Out of Food in the Last Year:     [address] in the Last Year:    Transportation Needs:     Lack of Transportation (Medical):      Lack of Transportation (Non-Medical):    Physical Activity:     Days of Exercise per Week:     Minutes of Exercise per Session:    Stress:     Feeling of Stress :    Social Connections:     Frequency of Communication with Friends and Family:     Frequency of Social Gatherings with Friends and Family:     Attends Church Services:     Active Member of Clubs or Organizations:     Attends Club or Organization Meetings:     Marital Status:    Intimate Partner Violence:     Fear of Current or Ex-Partner:     Emotionally Abused:     Physically Abused:     Sexually Abused:      Family History   Problem Relation Age of Onset    Cancer Mother     Diabetes Mother     High Blood Pressure Mother     Cancer Father     Diabetes Father     High Blood Pressure Father      Current Outpatient Medications   Medication Sig Dispense Refill    torsemide (DEMADEX) 20 MG tablet Take 60 mg by mouth every morning      potassium chloride (KLOR-CON M) 20 MEQ extended release tablet Take 20 mEq by mouth daily      cefdinir (OMNICEF) 300 MG capsule       linezolid (ZYVOX) 600 MG tablet Take 600 mg by mouth 2 times daily      NIFEdipine (ADALAT CC) 30 MG extended release tablet Take 30 mg by mouth nightly      Insulin Pen Needle (B-D UF III MINI PEN NEEDLES) 31G X 5 MM MISC USE FIVE TIMES DAILY 400 each 5    insulin aspart (NOVOLOG FLEXPEN) 100 UNIT/ML injection pen Take 80 units three times daily 50 pen 4    insulin detemir (LEVEMIR FLEXTOUCH) 100 UNIT/ML injection pen INJECT 95 UNITS UNDER THE SKIN TWO TIMES A DAY 60 pen 2    Glucagon (BAQSIMI ONE PACK) 3 MG/DOSE POWD To be used in case of severe hypoglycemia 2 each 3    Multiple Vitamin (MULTIVITAMIN ADULT PO) Take by mouth      Ferrous Sulfate (IRON PO) Take by mouth      VITAMIN D PO Take by mouth      CALCIUM CITRATE PO Take by mouth      metoprolol succinate (TOPROL XL) 25 MG extended release tablet Take 25 mg by mouth nightly      blood glucose monitor strips Test blood sugar 6 times per day, one touch verio flex test strip 600 strip 5    Blood Glucose Monitoring Suppl (ONETOUCH VERIO FLEX SYSTEM) w/Device KIT Check blood sugar 1 kit 0    diazepam (VALIUM) 5 MG tablet Take 5 mg by mouth 2 times daily as needed for Anxiety. Pricila All doxepin (SINEQUAN) 50 MG capsule Take 100 mg by mouth nightly      aspirin 81 MG tablet Take 81 mg by mouth      pravastatin (PRAVACHOL) 80 MG tablet Take 80 mg by mouth      HYDROcodone-acetaminophen (NORCO) 5-325 MG per tablet Take 5-250 tablets by mouth every 6 hours as needed for Pain.  citalopram (CELEXA) 20 MG tablet Take 40 mg by mouth daily       Erythromycin 2 % OINT Apply topically as needed       gabapentin (NEURONTIN) 600 MG tablet Take 300 mg by mouth 3 times daily.  pregabalin (LYRICA) 200 MG capsule Take 200 mg by mouth. Pt takes BID      LamoTRIgine (LAMICTAL XR) 200 MG TB24 Take 200 mg by mouth daily        No current facility-administered medications for this visit.      Allergies   Allergen Reactions    Lipitor     Sumatriptan      Family Status   Relation Name Status    Mother  Alive    Father      Sister  Alive         OBJECTIVE:  BP (!) 141/59   Pulse 73   Resp 16   Ht 5' 3\" (1.6 m)   Wt 230 lb (104.3 kg)   BMI 40.74 kg/m²    Wt Readings from Last 3 Encounters:   21 230 lb (104.3 kg)   20 255 lb (115.7 kg)   19 271 lb (122.9 kg)         Constitutional: no acute distress, well appearing and well nourished  Psychiatric: oriented to person, place and time, judgement and insight and normal, recent and remote memory intact and mood and affect are normal  Skin: skin and subcutaneous tissue is normal without visible mass,   Head and Face: visual inspection  of head and face revealed no abnormalities  Eyes: visual inspection showed no lid or conjunctival swelling, erythema or discharge, pupils are normal, equal, round  Ears/Nose: external inspection of amputated in sept 2021 closely follows with podiatry   - she has one [facility] level which was not normal so I ordred 24 hr urine cortisol and 1 mg dex supression which pt never got done ---    OBESITY s/p gastric sleeve in oct 2020   She has lost 50 lbs since surgery   She is still losing 1 lb week    --- Essential hypertension and congestive heart failure  Stable follows with Dr Lele Ceballos   CHF symptoms have improved since she had gastric sleeve surgery in 2020 and also was able to lose 50 pounds since then.      ---- Mixed hyperlipidemia  HYPERTRIGLYCERIDEMIA level was 621>. 231 >>441>>336>>1089>>300 >>196  She stopped gemfibrozil since hospital stay   Pills is too big for her to swallow  She will stay off and will monitor level    ---pravachol 40        CAD s/p angioplasty   Follows with cardiology   Did cardiac rehab   Heart cath in oct 2017     Vit D def level was 14.3 improved to mid 26 >>23.4   patient is not compliant with vitamin D    DEXA scan 2015 was normal   Repeat in September 2018 had normal bone mineral density    PSYCH ISSUES /BIPOLAR DISORDER   on remeron   Follows with therapist     MNG   largest nodule in rt lobe 9 mm ---stable last  imaging in June 2017 stable shows MNG   She was given orders for repeat imaging which were not done, the orders are given again today with this visit    HX OF HYPOTHYROIDISM   -she is noted to be euthyroid without taking her LT4 for years       Reviewed and/or ordered clinical lab results Yes  Reviewed and/or ordered radiology tests Yes  Reviewed and/or ordered other diagnostic tests Yes  Made a decision to obtain old records Yes  Reviewed and summarized old records Yes      Blanca Carroll was counseled regarding symptoms of current diagnosis, course and complications of disease if inadequately treated, side effects of medications, diagnosis, treatment options, and prognosis, risks, benefits, complications, and alternatives of treatment, labs, imaging and other studies and treatment targets and goals. She understands instructions and counseling. These diagnosis were discussed and reviewed with the patient including the advantages of drug therapy. She was counseled at this visit on the following: diabetes complication prevention and foot care. I spent  40 + minutes which includes reviewing patient chart , interpreting previous lab results  , discussing and providing counseling and coordinating care of patient's multiple health issues with  the patient. Return in about 3 months (around 12/9/2021).

## 2021-09-09 NOTE — PATIENT INSTRUCTIONS
GOALS - 1. HBA1C (3MONTH AVG) SHOULD BE LESS THAN 6.5     PLEASE CHECK YOUR SUGARS:   BEFORE BREAKFAST  BEFORE LUNCH  BEFORE DINNER  BEDTIME  AFTER MEALS    2. FINGERSTICKS SHOULD BE   BEFORE MEALS <   AFTER MEALS < 140   BEDTIME >100     3. CARBOHYDRATES  MEALS 40--60 G  SNACKS 15 - 20 G    4.  BLOOD PRESSURE  < 130/80    --- lantus 40  Units BID     ---Start taking short acting insulin (humalog/novolog) according to carbohydrate to insulin ratio 5  gm of carb = 1 unit of short acting insulin                                        +    Sliding Scale Insulin for short acting insulin   If BS > 120 -150 take 2 additional units of fast actinginsulin   if --180 take 4 units   181-210 take 6 Units  211-240 take 8 Units   241--270 take 10 Units   271- 300 take 12 Units

## 2021-09-14 RX ORDER — GLUCAGON INJECTION, SOLUTION 1 MG/.2ML
INJECTION, SOLUTION SUBCUTANEOUS
Qty: 2 EACH | Refills: 3 | Status: SHIPPED | OUTPATIENT
Start: 2021-09-14

## 2022-01-04 ENCOUNTER — VIRTUAL VISIT (OUTPATIENT)
Dept: ENDOCRINOLOGY | Age: 67
End: 2022-01-04
Payer: MEDICARE

## 2022-01-04 DIAGNOSIS — E66.01 CLASS 3 SEVERE OBESITY DUE TO EXCESS CALORIES WITH SERIOUS COMORBIDITY AND BODY MASS INDEX (BMI) OF 45.0 TO 49.9 IN ADULT (HCC): ICD-10-CM

## 2022-01-04 DIAGNOSIS — E78.2 MIXED HYPERLIPIDEMIA: ICD-10-CM

## 2022-01-04 DIAGNOSIS — I10 ESSENTIAL HYPERTENSION: Primary | ICD-10-CM

## 2022-01-04 DIAGNOSIS — I25.10 CORONARY ARTERY DISEASE INVOLVING NATIVE CORONARY ARTERY OF NATIVE HEART WITHOUT ANGINA PECTORIS: ICD-10-CM

## 2022-01-04 PROCEDURE — 3017F COLORECTAL CA SCREEN DOC REV: CPT | Performed by: INTERNAL MEDICINE

## 2022-01-04 PROCEDURE — G8427 DOCREV CUR MEDS BY ELIG CLIN: HCPCS | Performed by: INTERNAL MEDICINE

## 2022-01-04 PROCEDURE — 1090F PRES/ABSN URINE INCON ASSESS: CPT | Performed by: INTERNAL MEDICINE

## 2022-01-04 PROCEDURE — G8400 PT W/DXA NO RESULTS DOC: HCPCS | Performed by: INTERNAL MEDICINE

## 2022-01-04 PROCEDURE — 2022F DILAT RTA XM EVC RTNOPTHY: CPT | Performed by: INTERNAL MEDICINE

## 2022-01-04 PROCEDURE — 4040F PNEUMOC VAC/ADMIN/RCVD: CPT | Performed by: INTERNAL MEDICINE

## 2022-01-04 PROCEDURE — 3046F HEMOGLOBIN A1C LEVEL >9.0%: CPT | Performed by: INTERNAL MEDICINE

## 2022-01-04 PROCEDURE — 99214 OFFICE O/P EST MOD 30 MIN: CPT | Performed by: INTERNAL MEDICINE

## 2022-01-04 PROCEDURE — 1123F ACP DISCUSS/DSCN MKR DOCD: CPT | Performed by: INTERNAL MEDICINE

## 2022-01-04 RX ORDER — INSULIN ASPART 100 [IU]/ML
INJECTION, SOLUTION INTRAVENOUS; SUBCUTANEOUS
Qty: 50 PEN | Refills: 4 | Status: SHIPPED | OUTPATIENT
Start: 2022-01-04

## 2022-01-04 RX ORDER — PEN NEEDLE, DIABETIC 31 GX5/16"
NEEDLE, DISPOSABLE MISCELLANEOUS
Qty: 400 EACH | Refills: 5 | Status: SHIPPED | OUTPATIENT
Start: 2022-01-04 | End: 2022-10-20 | Stop reason: SDUPTHER

## 2022-01-04 RX ORDER — BLOOD SUGAR DIAGNOSTIC
STRIP MISCELLANEOUS
Qty: 200 EACH | Refills: 5 | Status: SHIPPED | OUTPATIENT
Start: 2022-01-04 | End: 2022-10-20 | Stop reason: SDUPTHER

## 2022-01-04 RX ORDER — INSULIN DETEMIR 100 [IU]/ML
INJECTION, SOLUTION SUBCUTANEOUS
Qty: 60 PEN | Refills: 2 | Status: SHIPPED | OUTPATIENT
Start: 2022-01-04

## 2022-01-04 NOTE — PROGRESS NOTES
Ayden Cantor is a 73 y.o. female  seen for the management of uncontrolled Type 2 diabetes, hyperlipidemia and obesity  . Pt has T2DM which is uncontrolled and is complicated by severe peripheral neuropathy ,retinopathy and nephropathy. Pt was diagnosed in year 1995 when she was being evaluated for hypertriglyceridemia GEORGE has been very noncompliant she admits that her depression contributes to her noncompliance   Sadie Printers has been obese most of her adult life  and she doesn't follow any dietry restriction. -- she has CAD s/p  stents 2016  And has CHF as well follows with Dr Mallory Noland     Her fingerstick blood glucose  improved when  compliance improved in oct/nov 2017 but she stopped taking insulin regularly again and her A1c worsened  Finally she has started taking insulin more often and started checking her numbers and some of her fingerstick blood glucose were in the 100 range but she went back to her noncompliance after a few months of better control     ---she was hospitalized for CHF worsening and renal insufficiency in jan 2020 and then again in august 2020   ---She had gastric sleeve in Oct 2020 lost 50 lbs in total       In end of July she had issues with  Her Rt foot --she was in the hospital in [address] [place] [address] for Rt toe infection / amputation   INTERIM:    Diabetes  She presents for her follow-up diabetic visit. She has type 2 diabetes mellitus. No MedicAlert identification noted. The initial diagnosis of diabetes was made 23 years ago. Her disease course has been stable. Hypoglycemia symptoms include confusion, nervousness/anxiousness, sweats and tremors. Associated symptoms include weakness and weight loss. Pertinent negatives for diabetes include no chest pain and no fatigue. There are no hypoglycemic complications. Symptoms are stable. Diabetic complications include a CVA, heart disease, peripheral neuropathy and retinopathy.  Risk factors for coronary artery disease include diabetes mellitus, dyslipidemia, family history and hypertension. She is compliant with treatment most of the time. Her weight is decreasing steadily. She is following a diabetic and low salt diet. Meal planning includes avoidance of concentrated sweets and carbohydrate counting. She has had a previous visit with a dietitian. She rarely participates in exercise. Her home blood glucose trend is decreasing steadily. Her breakfast blood glucose is taken between 8-9 am. Her breakfast blood glucose range is generally 130-140 mg/dl. Her lunch blood glucose is taken between 12-1 pm. Her lunch blood glucose range is generally 130-140 mg/dl. Her dinner blood glucose is taken between 6-7 pm. Her dinner blood glucose range is generally 140-180 mg/dl. An ACE inhibitor/angiotensin II receptor blocker is being taken. She sees a podiatrist.Eye exam is current.        Fasting glucose 150 --200 range some over 250     She has been taking lantus 40 units BID   And novolog as per sliding scale       Weight trend: stable  Prior visit with dietician: yes  Current diet: on average, 3 meals per day  Current exercise: rare    Past Medical History:   Diagnosis Date    Abdominal pain, right upper quadrant [id number]    Abnormal weight gain 5/3/2010    Bell's palsy [id number]    Benign intracranial hypertension 5/3/2010    Class 3 severe obesity due to excess calories with serious comorbidity and body mass index (BMI) of 45.0 to 49.9 in adult ([facility] 1/22/2019    Depressive disorder, not elsewhere classified 5/3/2010    Dizziness and giddiness     Edema     Esophageal reflux 5/3/2010    Fibromyalgia     Gastroparesis     Lumbago     Mononeuritis of unspecified site 5/3/2010    Onychia and paronychia of toe [id number]    Other and unspecified hyperlipidemia 5/3/2010    Other nonspecific abnormal serum enzyme levels 5/3/2010    Other nonspecific abnormal serum enzyme levels [id number]    Pain in joint, pelvic region and thigh [id number]    Type II or unspecified type diabetes mellitus with neurological manifestations, not stated as uncontrolled(250.60) 5/3/2010    Type II or unspecified type diabetes mellitus without mention of complication, not stated as uncontrolled 5/3/2010    Unspecified essential hypertension 5/3/2010    Unspecified vitamin D deficiency 5/3/2010      Patient Active Problem List   Diagnosis    Vitamin D deficiency    Essential hypertension    Mixed hyperlipidemia    Mononeuritis    Esophageal reflux    Depressive disorder, not elsewhere classified    Benign intracranial hypertension    Fibromyalgia    Uncontrolled type 2 diabetes mellitus with complication, with long-term current use of insulin (HCC)    Proteinuria    Hypercalcemia    Arthritis    Class 3 severe obesity due to excess calories with serious comorbidity and body mass index (BMI) of 45.0 to 49.9 in adult [facility])    Coronary artery disease involving native coronary artery of native heart without angina pectoris     Past Surgical History:   Procedure Laterality Date    CATARACT REMOVAL Bilateral 2018    GYNECOLOGIC CRYOSURGERY      D&C    HYSTERECTOMY, TOTAL ABDOMINAL      SHOULDER SURGERY      WRIST SURGERY       Social History     Socioeconomic History    Marital status: Single     Spouse name: Not on file    Number of children: Not on file    Years of education: Not on file    Highest education level: Not on file   Occupational History    Not on file   Tobacco Use    Smoking status: Former Smoker    Smokeless tobacco: Never Used   Vaping Use    Vaping Use: Never used   Substance and Sexual Activity    Alcohol use: No    Drug use: No    Sexual activity: Not on file   Other Topics Concern    Not on file   Social History Narrative    Not on file     Social Determinants of Health     Financial Resource Strain:     Difficulty of Paying Living Expenses: Not on file   Food Insecurity:     Worried About Running Out of Food in the Last Year: Not on file    Keron erickson Food in the Last Year: Not on file   Transportation Needs:     Lack of Transportation (Medical): Not on file    Lack of Transportation (Non-Medical): Not on file   Physical Activity:     Days of Exercise per Week: Not on file    Minutes of Exercise per Session: Not on file   Stress:     Feeling of Stress : Not on file   Social Connections:     Frequency of Communication with Friends and Family: Not on file    Frequency of Social Gatherings with Friends and Family: Not on file    Attends Jehovah's witness Services: Not on file    Active Member of Clubs or Organizations: Not on file    Attends Club or Organization Meetings: Not on file    Marital Status: Not on file   Intimate Partner Violence:     Fear of Current or Ex-Partner: Not on file    Emotionally Abused: Not on file    Physically Abused: Not on file    Sexually Abused: Not on file   Housing Stability:     Unable to Pay for Housing in the Last Year: Not on file    Number of Places Lived in the Last Year: Not on file    Unstable Housing in the Last Year: Not on file     Family History   Problem Relation Age of Onset    Cancer Mother     Diabetes Mother     High Blood Pressure Mother     Cancer Father     Diabetes Father     High Blood Pressure Father      Current Outpatient Medications   Medication Sig Dispense Refill    Insulin Pen Needle (B-D UF III MINI PEN NEEDLES) 31G X 5 MM MISC USE FIVE TIMES DAILY 400 each 5    insulin aspart (NOVOLOG FLEXPEN) 100 UNIT/ML injection pen Take 80 units three times daily 50 pen 4    insulin detemir (LEVEMIR FLEXTOUCH) 100 UNIT/ML injection pen INJECT 95 UNITS UNDER THE SKIN TWO TIMES A DAY 60 pen 2    ONETOUCH VERIO strip Test 4 times daily.  200 each 5    Glucagon (GVOKE HYPOPEN 2-PACK) 1 MG/0.2ML SOAJ Inject 1 mg into the skin as needed for hypoglycemia 2 each 3    torsemide (DEMADEX) 20 MG tablet Take 80 mg by mouth every morning       potassium chloride (KLOR-CON M) 20 MEQ extended release tablet Take 20 mEq by mouth daily      NIFEdipine (ADALAT CC) 30 MG extended release tablet Take 30 mg by mouth nightly      Multiple Vitamin (MULTIVITAMIN ADULT PO) Take by mouth      Ferrous Sulfate (IRON PO) Take by mouth      VITAMIN D PO Take by mouth      CALCIUM CITRATE PO Take by mouth      metoprolol succinate (TOPROL XL) 25 MG extended release tablet Take 25 mg by mouth nightly      blood glucose monitor strips Test blood sugar 6 times per day, one touch verio flex test strip 600 strip 5    Blood Glucose Monitoring Suppl (ONETOUCH VERIO FLEX SYSTEM) w/Device KIT Check blood sugar 1 kit 0    diazepam (VALIUM) 5 MG tablet Take 5 mg by mouth 2 times daily as needed for Anxiety. Arloa Wesley doxepin (SINEQUAN) 50 MG capsule Take 100 mg by mouth nightly      aspirin 81 MG tablet Take 81 mg by mouth      pravastatin (PRAVACHOL) 80 MG tablet Take 80 mg by mouth      HYDROcodone-acetaminophen (NORCO) 5-325 MG per tablet Take 5-250 tablets by mouth every 6 hours as needed for Pain.  citalopram (CELEXA) 20 MG tablet Take 40 mg by mouth daily       Erythromycin 2 % OINT Apply topically as needed       gabapentin (NEURONTIN) 600 MG tablet Take 300 mg by mouth 3 times daily.  pregabalin (LYRICA) 200 MG capsule Take 200 mg by mouth. Pt takes BID      LamoTRIgine (LAMICTAL XR) 200 MG TB24 Take 200 mg by mouth daily        No current facility-administered medications for this visit. Allergies   Allergen Reactions    Lipitor     Sumatriptan      Family Status   Relation Name Status    Mother  Alive    Father      Sister  Alive         OBJECTIVE:  There were no vitals taken for this visit.    Wt Readings from Last 3 Encounters:   21 230 lb (104.3 kg)   20 255 lb (115.7 kg)   19 271 lb (122.9 kg)         Constitutional: no acute distress, well appearing and well nourished  Psychiatric: oriented to person, place and time, judgement and insight and normal, recent and remote memory intact and mood and affect are normal  Skin: skin and subcutaneous tissue is normal without visible mass,   Head and Face: visual inspection  of head and face revealed no abnormalities  Eyes: visual inspection showed no lid or conjunctival swelling, erythema or discharge, pupils are normal, equal, round  Ears/Nose: external inspection of ears and nose revealed no abnormalities, hearing is grossly normal  Oropharynx/Mouth/Face: lips, tongue and gums appear  normal with no lesions, the voice quality was normal  Neck: neck appears symmetric, with no visible masses,   Pulmonary: no increased work of breathing or signs of respiratory distress,  Musculoskeletal: normal on inspection    Neurological: normal coordination and normal general cortical function          Lab Results   Component Value Date    LABA1C 6.9 11/13/2020    LABA1C 8.9 01/09/2020    LABA1C 11.3 11/14/2019         ASSESSMENT/PLAN:      --- Type 2 diabetes mellitus with complication, with long-term current use of insulin ---10.5>>11.1>>9.7>>8.5>>10.5>>11.0 >>11.3 >>9.5 >>9.4 >>10.0>>11.3>>10.3>>10.8>>10.4>>9.9>>11.9>>10.8>>9.9 >>8.5>>7.2>>9.1>>9.9>>11.3>>9.1>>11.3>>8.9>>6.9>8.6>9.0  AIC 9.4 in dec 2021 done at [address] has worsened  ----She takes 40 units of Levemir and her fasting are running in the 90---110 range most likely she is not checking premeals numbers, she recently helped move in her mom and has not been strict with diet or checking glucose  She has been taking 4 to 6 units of short acting insulin with the meals. Advised her to send glucose logs appropriately significantly made. Previously I had prescribed Synjardy but she was not able to tolerate it but patient thinks that she never had any side effects to the Jardiance part of the medication and does not recall having a yeast infection.   In that case I would like her to resume SGLT2 inhibitors she will discuss with her nephrologist as well as Dr. Kristi Rankin.  --Previously discussed Dexcom and OmniPod      Gastric sleeve in Oct 2020 lost 50+ lbs weight loss so far,       Sometimes is not compliant with diet but will work on that  --Glipizide stopped in May 2019   --synjardy ( pt couldn't tolerate SE stopped it  Due to yeast infection )   ---started trulicity in may 8265 she was not compliant        Health maintenance  she had diabetic retinopthay and ended up getting laser treatment she is s/p laser ---august 2021  got injection for retinopathy   -last microalb/creat ratio was 97.7>> 43>>78 in 8/2019 abnormal labs discussed in detail with the patient   has nephropathy aware of that ---she now follows with nephrologist   -foot exam Follows with podiatry ---rt toe amputated in sept 2021 closely follows with podiatry   - she has one [address] level which was not normal so I ordred 24 hr urine cortisol and 1 mg dex supression which pt never got done ---    OBESITY s/p gastric sleeve in oct 2020   She has lost 50 lbs since surgery   She is still losing 1lb week    --- Essential hypertension and congestive heart failure  Stable follows with Dr Fred Johnston   CHF symptoms have improved since she had gastric sleeve surgery in 2020 and also was able to lose 50 pounds since then. Patient is on torsemide and potassium chloride supplement as per CHF clinic    ---- Mixed hyperlipidemia  HYPERTRIGLYCERIDEMIA level was 621>. 231 >>441>>336>>1089>>300 >>196  She stopped gemfibrozil since hospital stay   Pills was too big for her to swallow  She will stay off and will monitor level    ---pravachol 40    Advised to get blood work done      CAD s/p angioplasty   Follows with cardiology   Did cardiac rehab   Heart cath in oct 2017     Vit D def level was 14.3 improved to mid 26 >>23.4   patient is not compliant with vitamin D    DEXA scan 2015 was normal   Repeat in September 2018 had normal bone mineral density    PSYCH ISSUES /BIPOLAR DISORDER    stable on medication.     MNG   largest nodule in rt lobe 9 mm ---stable last  imaging in June 2017 stable shows MNG   She was given orders for repeat imaging which were not done,       HX OF HYPOTHYROIDISM   -she is noted to be euthyroid without taking her LT4 for years       Reviewed and/or ordered clinical lab results Yes  Reviewed and/or ordered radiology tests Yes  Reviewed and/or ordered other diagnostic tests Yes  Made a decision to obtain old records Yes  Reviewed and summarized old records Yes      Brad Mcdaniels was counseled regarding symptoms of current diagnosis, course and complications of disease if inadequately treated, side effects of medications, diagnosis, treatment options, and prognosis, risks, benefits, complications, and alternatives of treatment, labs, imaging and other studies and treatment targets and goals. She understands instructions and counseling. These diagnosis were discussed and reviewed with the patient including the advantages of drug therapy. She was counseled at this visit on the following: diabetes complication prevention and foot care. TELEHEALTH EVALUATION -- Audio/Visual (During TVKEC-98 public health emergency)  Pursuant to the emergency declaration under the [facility] waiver authority and the [employer] and [employer] Act, this Virtual  Visit was conducted, with patient's consent, to reduce the patient's risk of exposure to COVID-19 and provide care for  patient. Services were provided through a video synchronous discussion virtually to substitute for in-person clinic visit. Patient's location : home     Patient provided verbal consent to use the video visit. Total time spent : 20 min Reviewing the chart, conducting an interview, performing a limited exam by video and educating the patient on my assessment plan. Return in about 3 months (around 4/4/2022).

## 2022-01-04 NOTE — Clinical Note
Please schedule patient for a follow-up 3 to 4 months and mail her glucose logs as well as lab orders

## 2022-02-17 ENCOUNTER — TELEPHONE (OUTPATIENT)
Dept: ENDOCRINOLOGY | Age: 67
End: 2022-02-17

## 2022-03-02 ENCOUNTER — TELEPHONE (OUTPATIENT)
Dept: ENDOCRINOLOGY | Age: 67
End: 2022-03-02

## 2022-03-02 RX ORDER — EMPAGLIFLOZIN 10 MG/1
1 TABLET, FILM COATED ORAL DAILY
Qty: 30 TABLET | Refills: 5 | Status: SHIPPED | OUTPATIENT
Start: 2022-03-02

## 2022-03-02 NOTE — TELEPHONE ENCOUNTER
Patient said you wanted to send Jardiance to pharmacy.  I told her you would send in in this afternoon

## 2022-10-13 ENCOUNTER — TELEPHONE (OUTPATIENT)
Dept: ENDOCRINOLOGY | Age: 67
End: 2022-10-13

## 2022-10-20 ENCOUNTER — OFFICE VISIT (OUTPATIENT)
Dept: ENDOCRINOLOGY | Age: 67
End: 2022-10-20
Payer: MEDICARE

## 2022-10-20 VITALS
WEIGHT: 238 LBS | HEIGHT: 63 IN | DIASTOLIC BLOOD PRESSURE: 70 MMHG | HEART RATE: 78 BPM | SYSTOLIC BLOOD PRESSURE: 164 MMHG | BODY MASS INDEX: 42.17 KG/M2 | RESPIRATION RATE: 16 BRPM

## 2022-10-20 DIAGNOSIS — I10 ESSENTIAL HYPERTENSION: ICD-10-CM

## 2022-10-20 DIAGNOSIS — E78.2 MIXED HYPERLIPIDEMIA: ICD-10-CM

## 2022-10-20 DIAGNOSIS — I25.10 CORONARY ARTERY DISEASE INVOLVING NATIVE CORONARY ARTERY OF NATIVE HEART WITHOUT ANGINA PECTORIS: ICD-10-CM

## 2022-10-20 DIAGNOSIS — E11.9 TYPE 2 DIABETES MELLITUS WITHOUT COMPLICATION, WITH LONG-TERM CURRENT USE OF INSULIN (HCC): Primary | ICD-10-CM

## 2022-10-20 DIAGNOSIS — Z79.4 TYPE 2 DIABETES MELLITUS WITHOUT COMPLICATION, WITH LONG-TERM CURRENT USE OF INSULIN (HCC): Primary | ICD-10-CM

## 2022-10-20 PROCEDURE — 2022F DILAT RTA XM EVC RTNOPTHY: CPT | Performed by: INTERNAL MEDICINE

## 2022-10-20 PROCEDURE — G8400 PT W/DXA NO RESULTS DOC: HCPCS | Performed by: INTERNAL MEDICINE

## 2022-10-20 PROCEDURE — 3046F HEMOGLOBIN A1C LEVEL >9.0%: CPT | Performed by: INTERNAL MEDICINE

## 2022-10-20 PROCEDURE — 1123F ACP DISCUSS/DSCN MKR DOCD: CPT | Performed by: INTERNAL MEDICINE

## 2022-10-20 PROCEDURE — 99215 OFFICE O/P EST HI 40 MIN: CPT | Performed by: INTERNAL MEDICINE

## 2022-10-20 PROCEDURE — G8417 CALC BMI ABV UP PARAM F/U: HCPCS | Performed by: INTERNAL MEDICINE

## 2022-10-20 PROCEDURE — 1036F TOBACCO NON-USER: CPT | Performed by: INTERNAL MEDICINE

## 2022-10-20 PROCEDURE — 1090F PRES/ABSN URINE INCON ASSESS: CPT | Performed by: INTERNAL MEDICINE

## 2022-10-20 PROCEDURE — G8427 DOCREV CUR MEDS BY ELIG CLIN: HCPCS | Performed by: INTERNAL MEDICINE

## 2022-10-20 PROCEDURE — 3017F COLORECTAL CA SCREEN DOC REV: CPT | Performed by: INTERNAL MEDICINE

## 2022-10-20 PROCEDURE — G8484 FLU IMMUNIZE NO ADMIN: HCPCS | Performed by: INTERNAL MEDICINE

## 2022-10-20 RX ORDER — PEN NEEDLE, DIABETIC 31 GX5/16"
NEEDLE, DISPOSABLE MISCELLANEOUS
Qty: 400 EACH | Refills: 5 | Status: SHIPPED | OUTPATIENT
Start: 2022-10-20

## 2022-10-20 RX ORDER — TOPIRAMATE 25 MG/1
25 TABLET ORAL DAILY
COMMUNITY
Start: 2022-08-30

## 2022-10-20 RX ORDER — BLOOD SUGAR DIAGNOSTIC
STRIP MISCELLANEOUS
Qty: 200 EACH | Refills: 5 | Status: SHIPPED | OUTPATIENT
Start: 2022-10-20

## 2022-10-20 RX ORDER — BUSPIRONE HYDROCHLORIDE 15 MG/1
15 TABLET ORAL 2 TIMES DAILY
COMMUNITY

## 2022-10-20 RX ORDER — FOLIC ACID 1 MG/1
TABLET ORAL
COMMUNITY
Start: 2022-09-26

## 2022-10-20 NOTE — PROGRESS NOTES
Veronica Radford is a 78 y.o. female  seen for the management of uncontrolled Type 2 diabetes, hyperlipidemia and obesity  . Pt has T2DM which is uncontrolled and is complicated by severe peripheral neuropathy ,retinopathy and nephropathy. Pt was diagnosed in year 1995 when she was being evaluated for hypertriglyceridemia GEORGE has been very noncompliant she admits that her depression contributes to her noncompliance   Jessa Carias has been obese most of her adult life  and she doesn't follow any dietry restriction. -- she has CAD s/p  stents 2016  And has CHF as well follows with Dr Pati Gr     Her fingerstick blood glucose  improved when  compliance improved in oct/nov 2017 but she stopped taking insulin regularly again and her A1c worsened  Finally she has started taking insulin more often and started checking her numbers and some of her fingerstick blood glucose were in the 100 range but she went back to her noncompliance after a few months of better control     ---she was hospitalized for CHF worsening and renal insufficiency in jan 2020 and then again in august 2020   ---She had gastric sleeve in Oct 2020 lost 50 lbs in total       In end of July she had issues with  Her Rt foot --she was in the hospital in [address] [place] [address] for Rt toe infection / amputation. INTERIM:    Diabetes  She presents for her follow-up diabetic visit. She has type 2 diabetes mellitus. No MedicAlert identification noted. The initial diagnosis of diabetes was made 23 years ago. Her disease course has been stable. Hypoglycemia symptoms include confusion, nervousness/anxiousness, sweats and tremors. Associated symptoms include weakness and weight loss. Pertinent negatives for diabetes include no chest pain and no fatigue. There are no hypoglycemic complications. Symptoms are stable. Diabetic complications include a CVA, heart disease, peripheral neuropathy and retinopathy.  Risk factors for coronary artery disease include diabetes mellitus, dyslipidemia, family history and hypertension. She is compliant with treatment most of the time. Her weight is decreasing steadily. She is following a diabetic and low salt diet. Meal planning includes avoidance of concentrated sweets and carbohydrate counting. She has had a previous visit with a dietitian. She rarely participates in exercise. Her home blood glucose trend is decreasing steadily. Her breakfast blood glucose is taken between 8-9 am. Her breakfast blood glucose range is generally 130-140 mg/dl. Her lunch blood glucose is taken between 12-1 pm. Her lunch blood glucose range is generally 130-140 mg/dl. Her dinner blood glucose is taken between 6-7 pm. Her dinner blood glucose range is generally 140-180 mg/dl. An ACE inhibitor/angiotensin II receptor blocker is being taken. She sees a podiatrist.Eye exam is current. Mom passed away in July and she has not been taking care of her diabetes or her other medical conditions as she feels depressed she lives by herself and is still not sorted out through her mom's belongings and is under stress about that but is planning on work on that.   She denies any chest pain shortness of breath or headache blood pressure is high today but she checks it at home where it is normal she was advised to check it      Weight trend: stable  Prior visit with dietician: yes  Current diet: on average, 3 meals per day  Current exercise: rare    Past Medical History:   Diagnosis Date    Abdominal pain, right upper quadrant [id number]    Abnormal weight gain 5/3/2010    Bell's palsy [id number]    Benign intracranial hypertension 5/3/2010    Class 3 severe obesity due to excess calories with serious comorbidity and body mass index (BMI) of 45.0 to 49.9 in adult ([facility] 1/22/2019    Depressive disorder, not elsewhere classified 5/3/2010    Dizziness and giddiness     Edema     Esophageal reflux 5/3/2010    Fibromyalgia     Gastroparesis     Lumbago     Mononeuritis of unspecified site 5/3/2010 Onychia and paronychia of toe [id number]    Other and unspecified hyperlipidemia 5/3/2010    Other nonspecific abnormal serum enzyme levels 5/3/2010    Other nonspecific abnormal serum enzyme levels [id number]    Pain in joint, pelvic region and thigh [id number]    Type II or unspecified type diabetes mellitus with neurological manifestations, not stated as uncontrolled(250.60) 5/3/2010    Type II or unspecified type diabetes mellitus without mention of complication, not stated as uncontrolled 5/3/2010    Unspecified essential hypertension 5/3/2010    Unspecified vitamin D deficiency 5/3/2010      Patient Active Problem List   Diagnosis    Vitamin D deficiency    Essential hypertension    Mixed hyperlipidemia    Mononeuritis    Esophageal reflux    Depressive disorder, not elsewhere classified    Benign intracranial hypertension    Fibromyalgia    Uncontrolled type 2 diabetes mellitus with complication, with long-term current use of insulin    Proteinuria    Hypercalcemia    Arthritis    Class 3 severe obesity due to excess calories with serious comorbidity and body mass index (BMI) of 45.0 to 49.9 in adult [facility])    Coronary artery disease involving native coronary artery of native heart without angina pectoris     Past Surgical History:   Procedure Laterality Date    CATARACT REMOVAL Bilateral 2018    GYNECOLOGIC CRYOSURGERY      D&C    HYSTERECTOMY, TOTAL ABDOMINAL (CERVIX REMOVED)      SHOULDER SURGERY      WRIST SURGERY       Social History     Socioeconomic History    Marital status: Single     Spouse name: Not on file    Number of children: Not on file    Years of education: Not on file    Highest education level: Not on file   Occupational History    Not on file   Tobacco Use    Smoking status: Former    Smokeless tobacco: Never   Vaping Use    Vaping Use: Never used   Substance and Sexual Activity    Alcohol use: No    Drug use: No    Sexual activity: Not on file   Other Topics Concern    Not on file   Social History Narrative    Not on file     Social Determinants of Health     Financial Resource Strain: Not on file   Food Insecurity: Not on file   Transportation Needs: Not on file   Physical Activity: Not on file   Stress: Not on file   Social Connections: Not on file   Intimate Partner Violence: Not on file   Housing Stability: Not on file     Family History   Problem Relation Age of Onset    Cancer Mother     Diabetes Mother     High Blood Pressure Mother     Cancer Father     Diabetes Father     High Blood Pressure Father      Current Outpatient Medications   Medication Sig Dispense Refill    topiramate (TOPAMAX) 25 MG tablet Take 25 mg by mouth daily      methotrexate (RHEUMATREX) 2.5 MG chemo tablet Take 7.5 mg by mouth      folic acid (FOLVITE) 1 MG tablet TAKE ONE TABLET BY MOUTH DAILY      busPIRone (BUSPAR) 15 MG tablet Take 15 mg by mouth 2 times daily      Insulin Pen Needle (B-D UF III MINI PEN NEEDLES) 31G X 5 MM MISC USE FIVE TIMES DAILY 400 each 5    ONETOUCH VERIO strip Test 4 times daily.  200 each 5    empagliflozin (JARDIANCE) 10 MG tablet Take 1 tablet by mouth daily (Patient taking differently: Take 25 mg by mouth daily 25 mg daily) 30 tablet 5    insulin aspart (NOVOLOG FLEXPEN) 100 UNIT/ML injection pen Take 80 units three times daily 50 pen 4    insulin detemir (LEVEMIR FLEXTOUCH) 100 UNIT/ML injection pen INJECT 95 UNITS UNDER THE SKIN TWO TIMES A DAY (Patient taking differently: INJECT 40 UNITS UNDER THE SKIN TWO TIMES A DAY) 60 pen 2    Glucagon (GVOKE HYPOPEN 2-PACK) 1 MG/0.2ML SOAJ Inject 1 mg into the skin as needed for hypoglycemia 2 each 3    torsemide (DEMADEX) 20 MG tablet Take 80 mg by mouth every morning       NIFEdipine (ADALAT CC) 30 MG extended release tablet Take 30 mg by mouth nightly      Multiple Vitamin (MULTIVITAMIN ADULT PO) Take by mouth      Ferrous Sulfate (IRON PO) Take by mouth      CALCIUM CITRATE PO Take by mouth      metoprolol succinate (TOPROL XL) 25 MG extended release tablet Take 25 mg by mouth nightly      blood glucose monitor strips Test blood sugar 6 times per day, one touch verio flex test strip 600 strip 5    Blood Glucose Monitoring Suppl (ONETOUCH VERIO FLEX SYSTEM) w/Device KIT Check blood sugar 1 kit 0    diazepam (VALIUM) 5 MG tablet Take 5 mg by mouth 2 times daily as needed for Anxiety. Tanda Bun doxepin (SINEQUAN) 50 MG capsule Take 100 mg by mouth nightly      aspirin 81 MG tablet Take 81 mg by mouth      pravastatin (PRAVACHOL) 80 MG tablet Take 80 mg by mouth      HYDROcodone-acetaminophen (NORCO) 5-325 MG per tablet Take 5-250 tablets by mouth every 6 hours as needed for Pain. citalopram (CELEXA) 20 MG tablet Take 40 mg by mouth daily       Erythromycin 2 % OINT Apply topically as needed       gabapentin (NEURONTIN) 600 MG tablet Take 300 mg by mouth 3 times daily. pregabalin (LYRICA) 200 MG capsule Take 200 mg by mouth. Pt takes BID      lamoTRIgine (LAMICTAL XR) 200 MG TB24 extended release tablet Take 200 mg by mouth daily        No current facility-administered medications for this visit. Allergies   Allergen Reactions    Lipitor     Sumatriptan      Family Status   Relation Name Status    Mother  Alive    Father      Sister  Alive         OBJECTIVE:  BP (!) 164/70   Pulse 78   Resp 16   Ht 5' 3\" (1.6 m)   Wt 238 lb (108 kg)   BMI 42.16 kg/m²    Wt Readings from Last 3 Encounters:   10/20/22 238 lb (108 kg)   21 230 lb (104.3 kg)   20 255 lb (115.7 kg)     ROS  I have reviewed the review of system questionnaire filled by the patient .   Patient was advised to contact PCP for non endocrine signs and symptoms       Constitutional: no acute distress, well appearing, well nourished  Psychiatric: oriented to person, place and time, judgement, insight and normal, recent and remote memory and intact and mood, affect are normal  Skin: skin and subcutaneous tissue is normal without mass,   Head and Face: examination of head and face revealed no abnormalities  Eyes: no lid or conjunctival swelling, no erythema or discharge, pupils are normal,   Ears/Nose: external inspection of ears and nose revealed no abnormalities, hearing is grossly normal  Oropharynx/Mouth/Face: lips, tongue and gums are normal with no lesions, the voice quality was normal  Neck: neck is supple and symmetric, with midline trachea and no masses, thyroid is normal    Pulmonary: no increased work of breathing or signs of respiratory distress, lungs are clear to auscultation  Cardiovascular: normal heart rate and rhythm, normal S1 and S2,   Musculoskeletal: normal gait and station,   Neurological: normal coordination, normal general cortical function'        Lab Results   Component Value Date/Time    LABA1C 6.9 11/13/2020 10:48 AM    LABA1C 8.9 01/09/2020 12:00 AM    LABA1C 11.3 11/14/2019 10:18 AM         ASSESSMENT/PLAN:      --- Type 2 diabetes mellitus with complication, with long-term current use of insulin ---10.5>>11.1>>9.7>>8.5>>10.5>>11.0 >>11.3 >>9.5 >>9.4 >>10.0>>11.3>>10.3>>10.8>>10.4>>9.9>>11.9>>10.8>>9.9 >>8.5>>7.2>>9.1>>9.9>>11.3>>9.1>>11.3>>8.9>>6.9>8.6>9.0  AIC 9.4 in dec 2021 done at [address] has worsened 12.4   She has not been taking her insulin regularly she was advised to go back to taking insulin regularly and start using Dexcom sensor that was at home which belonged to her mother. She will also apply for her own Dexcom which previously was not approved as it cost her for the transmitter which was not agreeable to her.  ----Resume 40 units of Levemir Advised to take 4 to 6 units of short acting insulin with each meal  Advised her to send glucose logs appropriately significantly made. Previously I had prescribed Synjardy but she was not able to tolerate it but patient thinks that she never had any side effects to the Jardiance part of the medication and does not recall having a yeast infection.   In that case I would like her to resume SGLT2 inhibitors she will discuss with her nephrologist as well as Dr. Louis Gallardo.  --Previously discussed Dexcom and OmniPod      Gastric sleeve in Oct 2020 lost 50+ lbs weight loss so far,       Sometimes is not compliant with diet but will work on that  --Glipizide stopped in May 2019   --synjardy ( pt couldn't tolerate SE stopped it  Due to yeast infection )   ---started trulicity in may 6189 she was not compliant        Health maintenance  she had diabetic retinopthay and ended up getting laser treatment she is s/p laser ---august 2021  got injection for retinopathy   -last microalb/creat ratio was 97.7>> 43>>78 in 8/2019 abnormal labs discussed in detail with the patient   has nephropathy aware of that ---she now follows with nephrologist   -foot exam Follows with podiatry ---rt toe amputated in sept 2021 closely follows with podiatry   - she has one [address] level which was not normal so I ordred 24 hr urine cortisol and 1 mg dex supression which pt never got done ---    OBESITY s/p gastric sleeve in oct 2020   She has lost 50 lbs since surgery   She is still losing 1lb week    --- Essential hypertension and congestive heart failure  Stable follows with Dr Louis Gallardo   CHF symptoms have improved since she had gastric sleeve surgery in 2020 and also was able to lose 50 pounds since then.   Patient is on torsemide and potassium chloride supplement as per CHF clinic    ---- Mixed hyperlipidemia  HYPERTRIGLYCERIDEMIA level was 621>. 231 >>441>>336>>1089>>300 >>196  She stopped gemfibrozil since hospital stay   Pills was too big for her to swallow  She will stay off and will monitor level    ---pravachol 40    Advised to get blood work done      CAD s/p angioplasty   Follows with cardiology   Did cardiac rehab   Heart cath in oct 2017     Vit D def level was 14.3 improved to mid 26 >>23.4   patient is not compliant with vitamin D    DEXA scan 2015 was normal   Repeat in September 2018 had normal bone mineral density    PSYCH ISSUES /BIPOLAR DISORDER    stable on medication. MNG   largest nodule in rt lobe 9 mm ---stable last  imaging in June 2017 stable shows MNG   She was given orders for repeat imaging which were not done,       HX OF HYPOTHYROIDISM   -she is noted to be euthyroid without taking her LT4 for years       Reviewed and/or ordered clinical lab results Yes  Reviewed and/or ordered radiology tests Yes  Reviewed and/or ordered other diagnostic tests Yes  Made a decision to obtain old records Yes  Reviewed and summarized old records Yes      Stephani Ellington was counseled regarding symptoms of current diagnosis, course and complications of disease if inadequately treated, side effects of medications, diagnosis, treatment options, and prognosis, risks, benefits, complications, and alternatives of treatment, labs, imaging and other studies and treatment targets and goals. She understands instructions and counseling. These diagnosis were discussed and reviewed with the patient including the advantages of drug therapy. She was counseled at this visit on the following: diabetes complication prevention and foot care. I spent  40 + minutes which includes reviewing patient chart , interpreting previous lab results  , discussing and providing counseling and coordinating care of patient's multiple health issues with  the patient. Return in about 3 months (around 1/20/2023).

## 2022-12-14 ENCOUNTER — TELEPHONE (OUTPATIENT)
Dept: ENDOCRINOLOGY | Age: 67
End: 2022-12-14

## 2023-04-25 ENCOUNTER — TELEPHONE (OUTPATIENT)
Dept: ENDOCRINOLOGY | Age: 68
End: 2023-04-25

## 2023-04-25 DIAGNOSIS — Z79.4 TYPE 2 DIABETES MELLITUS WITHOUT COMPLICATION, WITH LONG-TERM CURRENT USE OF INSULIN (HCC): ICD-10-CM

## 2023-04-25 DIAGNOSIS — E11.9 TYPE 2 DIABETES MELLITUS WITHOUT COMPLICATION, WITH LONG-TERM CURRENT USE OF INSULIN (HCC): ICD-10-CM

## 2023-04-25 RX ORDER — FLURBIPROFEN SODIUM 0.3 MG/ML
SOLUTION/ DROPS OPHTHALMIC
Qty: 400 EACH | Refills: 5 | Status: SHIPPED | OUTPATIENT
Start: 2023-04-25

## 2023-04-25 RX ORDER — INSULIN ASPART INJECTION 100 [IU]/ML
INJECTION, SOLUTION SUBCUTANEOUS
Qty: 150 ML | Refills: 3 | Status: SHIPPED | OUTPATIENT
Start: 2023-04-25

## 2023-04-25 RX ORDER — BLOOD SUGAR DIAGNOSTIC
STRIP MISCELLANEOUS
Qty: 200 EACH | Refills: 5 | Status: SHIPPED | OUTPATIENT
Start: 2023-04-25

## 2023-04-25 RX ORDER — INSULIN ASPART 100 [IU]/ML
INJECTION, SOLUTION INTRAVENOUS; SUBCUTANEOUS
Qty: 150 ML | Refills: 3 | Status: SHIPPED | OUTPATIENT
Start: 2023-04-25

## 2023-04-25 NOTE — TELEPHONE ENCOUNTER
Per epic Novolog/aspart and Humalog/lispro will need PA. Pati Rendon is covered. Please advise if change to Pati Rendon is appropriate or if a PA for insulin aspart is needed.

## 2023-04-25 NOTE — TELEPHONE ENCOUNTER
Patient aware with switching to [place]. She is also looking to see what CGM will be cheaper and will call the office back so we can send an order.

## 2023-04-25 NOTE — TELEPHONE ENCOUNTER
We can switch to Fiasp insulin, please let the patient know that her insurance is mandating the change and we can send a prescription for her.

## 2023-04-25 NOTE — TELEPHONE ENCOUNTER
Pt calling and requesting refill on One Touch Verio Test Strips & Novolog.  . Send to Shahid Londono on [address] [facility]   10-20-22  [facility]   5-16-23

## 2023-05-01 DIAGNOSIS — Z79.4 TYPE 2 DIABETES MELLITUS WITHOUT COMPLICATION, WITH LONG-TERM CURRENT USE OF INSULIN (HCC): Primary | ICD-10-CM

## 2023-05-01 DIAGNOSIS — E11.9 TYPE 2 DIABETES MELLITUS WITHOUT COMPLICATION, WITH LONG-TERM CURRENT USE OF INSULIN (HCC): Primary | ICD-10-CM

## 2023-05-02 ENCOUNTER — TELEPHONE (OUTPATIENT)
Dept: ENDOCRINOLOGY | Age: 68
End: 2023-05-02

## 2023-05-02 DIAGNOSIS — E11.9 TYPE 2 DIABETES MELLITUS WITHOUT COMPLICATION, WITH LONG-TERM CURRENT USE OF INSULIN (HCC): ICD-10-CM

## 2023-05-02 DIAGNOSIS — Z79.4 TYPE 2 DIABETES MELLITUS WITHOUT COMPLICATION, WITH LONG-TERM CURRENT USE OF INSULIN (HCC): ICD-10-CM

## 2023-05-02 RX ORDER — INSULIN DETEMIR 100 [IU]/ML
INJECTION, SOLUTION SUBCUTANEOUS
Qty: 171 ML | Refills: 2 | Status: SHIPPED | OUTPATIENT
Start: 2023-05-02 | End: 2023-05-02 | Stop reason: SDUPTHER

## 2023-05-02 NOTE — TELEPHONE ENCOUNTER
Call from [address] [facility] wanting clarification a prescription for Rx Levemir     The pharmacist stated that the directions states Sig: INJECT 95 UNITS UNDER THE SKIN TWO TIMES A DAY    They are wanting to know if that is the correct units for the pt to inject?     Please advise   CB# [phone]

## 2023-05-15 LAB
ALBUMIN SERPL-MCNC: 3.9 G/DL (ref 3.5–5.7)
ALP BLD-CCNC: 96 U/L (ref 36–125)
ALT SERPL-CCNC: 10 U/L (ref 7–52)
ANION GAP SERPL CALCULATED.3IONS-SCNC: 7 MMOL/L (ref 3–16)
AST SERPL-CCNC: 12 U/L (ref 13–39)
BILIRUB SERPL-MCNC: 0.3 MG/DL (ref 0–1.5)
BUN BLDV-MCNC: 39 MG/DL (ref 7–25)
CALCIUM SERPL-MCNC: 9 MG/DL (ref 8.6–10.3)
CHLORIDE BLD-SCNC: 106 MMOL/L (ref 98–110)
CHOLESTEROL, TOTAL: 187 MG/DL (ref 0–200)
CO2: 29 MMOL/L (ref 21–33)
CREAT SERPL-MCNC: 1.61 MG/DL (ref 0.6–1.3)
GFR, ESTIMATED: 35
GLUCOSE BLD-MCNC: 226 MG/DL (ref 70–100)
HDLC SERPL-MCNC: 41 MG/DL (ref 60–92)
LDL CHOLESTEROL CALCULATED: 99 MG/DL
NON-HDL CHOLESTEROL: 146 MG/DL (ref 0–129)
OSMOLALITY CALCULATION: 310 MOSM/KG (ref 278–305)
POTASSIUM SERPL-SCNC: 5.2 MMOL/L (ref 3.5–5.3)
SODIUM BLD-SCNC: 142 MMOL/L (ref 133–146)
TOTAL PROTEIN: 6.4 G/DL (ref 6.4–8.9)
TRIGL SERPL-MCNC: 233 MG/DL (ref 10–149)
TSH, 3RD GENERATION: 2.98 UIU/ML (ref 0.45–4.12)

## 2023-05-16 ENCOUNTER — TELEMEDICINE (OUTPATIENT)
Dept: ENDOCRINOLOGY | Age: 68
End: 2023-05-16
Payer: MEDICARE

## 2023-05-16 ENCOUNTER — TELEPHONE (OUTPATIENT)
Dept: ENDOCRINOLOGY | Age: 68
End: 2023-05-16

## 2023-05-16 DIAGNOSIS — E55.9 VITAMIN D DEFICIENCY: Primary | ICD-10-CM

## 2023-05-16 LAB — HBA1C MFR BLD: 10.5 % (ref 4–5.6)

## 2023-05-16 PROCEDURE — G8400 PT W/DXA NO RESULTS DOC: HCPCS | Performed by: INTERNAL MEDICINE

## 2023-05-16 PROCEDURE — 1090F PRES/ABSN URINE INCON ASSESS: CPT | Performed by: INTERNAL MEDICINE

## 2023-05-16 PROCEDURE — 99214 OFFICE O/P EST MOD 30 MIN: CPT | Performed by: INTERNAL MEDICINE

## 2023-05-16 PROCEDURE — 3017F COLORECTAL CA SCREEN DOC REV: CPT | Performed by: INTERNAL MEDICINE

## 2023-05-16 PROCEDURE — G8427 DOCREV CUR MEDS BY ELIG CLIN: HCPCS | Performed by: INTERNAL MEDICINE

## 2023-05-16 PROCEDURE — 1123F ACP DISCUSS/DSCN MKR DOCD: CPT | Performed by: INTERNAL MEDICINE

## 2023-05-16 RX ORDER — MEDROXYPROGESTERONE ACETATE 150 MG/ML
50 INJECTION, SUSPENSION INTRAMUSCULAR WEEKLY
COMMUNITY
Start: 2023-03-27

## 2023-05-16 NOTE — TELEPHONE ENCOUNTER
Left VM for patient that we can do a virtual or she can reschedule. If she can call back to let us know what she decides.

## 2023-05-16 NOTE — TELEPHONE ENCOUNTER
Pt called to cancel appt at 9:19 am for her 10 am appt as she is ill. I offered her a VV and she said no. I let her know there could be a chance she would be dismissed given past history. She asked to speak to Fan to know her options. I let her know I would send her a message.

## 2023-05-17 NOTE — PROGRESS NOTES
Left VM for patient to call office back to schedule a f/u appt. Reminder letter and lab orders placed in the mail.
since surgery   She has gained almost all the weight back     --- Essential hypertension and congestive heart failure  Stable follows with Dr Albert Mayer   CHF symptoms have improved since she had gastric sleeve surgery in 2020 and also was able to lose 50 pounds since then. Patient is on torsemide and potassium chloride supplement as per CHF clinic    ---- Mixed hyperlipidemia  HYPERTRIGLYCERIDEMIA level was 621>. 231 >>441>>336>>1089>>300 >>196  She stopped gemfibrozil since hospital stay   Pills was too big for her to swallow  She will stay off and will monitor level    ---pravachol 40    Advised to get blood work done      CAD s/p angioplasty   Follows with cardiology   Did cardiac rehab   Heart cath in oct 2017     Vit D def level was 14.3 improved to mid 26 >>23.4   patient is not compliant with vitamin D    DEXA scan 2015 was normal   Repeat in September 2018 had normal bone mineral density    PSYCH ISSUES /BIPOLAR DISORDER    stable on medication. MNG   largest nodule in rt lobe 9 mm ---stable last  imaging in June 2017 stable shows MNG   She was given orders for repeat imaging which were not done,       HX OF HYPOTHYROIDISM   -she is noted to be euthyroid without taking her LT4 for years       Reviewed and/or ordered clinical lab results Yes  Reviewed and/or ordered radiology tests Yes  Reviewed and/or ordered other diagnostic tests Yes  Made a decision to obtain old records Yes  Reviewed and summarized old records Yes      Perez Salinas was counseled regarding symptoms of current diagnosis, course and complications of disease if inadequately treated, side effects of medications, diagnosis, treatment options, and prognosis, risks, benefits, complications, and alternatives of treatment, labs, imaging and other studies and treatment targets and goals. She understands instructions and counseling. These diagnosis were discussed and reviewed with the patient including the advantages of drug therapy.  She was

## 2023-05-30 ENCOUNTER — TELEPHONE (OUTPATIENT)
Dept: ENDOCRINOLOGY | Age: 68
End: 2023-05-30

## 2023-05-30 DIAGNOSIS — Z79.4 TYPE 2 DIABETES MELLITUS WITHOUT COMPLICATION, WITH LONG-TERM CURRENT USE OF INSULIN (HCC): Primary | ICD-10-CM

## 2023-05-30 DIAGNOSIS — E11.9 TYPE 2 DIABETES MELLITUS WITHOUT COMPLICATION, WITH LONG-TERM CURRENT USE OF INSULIN (HCC): Primary | ICD-10-CM

## 2023-05-30 NOTE — TELEPHONE ENCOUNTER
Pt calling and asking for the phone # for the Dexcom rep?  She states she was given this phone # last yr and his misplaced it    CB# [phone]

## 2023-06-01 RX ORDER — BLOOD-GLUCOSE,RECEIVER,CONT
EACH MISCELLANEOUS
Qty: 1 EACH | Refills: 0 | OUTPATIENT
Start: 2023-06-01

## 2023-06-01 RX ORDER — BLOOD-GLUCOSE SENSOR
EACH MISCELLANEOUS
Qty: 3 EACH | Refills: 3 | OUTPATIENT
Start: 2023-06-01

## 2023-06-01 NOTE — TELEPHONE ENCOUNTER
G7 system pended to Elza. Please advise on switch from [facility] to [facility]. Per [employer] is not formulary and would require a PA.

## 2023-06-01 NOTE — TELEPHONE ENCOUNTER
Pt calling and states she spoke w/ Dexcom Rep and was told to call office so Rika Mahoney could help w/ paperwork and submitting to DME ([employer]?) to obtain the Dexcom G 7    Pt also states she went to weight loss clinic @ [facility] and they suggest instead of taking Ozempic to have it switched to  [facility]     CB# [phone]

## 2023-07-03 ENCOUNTER — TELEPHONE (OUTPATIENT)
Dept: ENDOCRINOLOGY | Age: 68
End: 2023-07-03

## 2023-07-03 NOTE — TELEPHONE ENCOUNTER
Per patient advised she called [facility] about the [facility] and she said it needs a PA    Please advise      Tirzepatide [facility]) 2.5 MG/0.5ML SOPN SC injection   Norman Echeverria [id number] - [address] [place] [address], [address] [place] [address], [address] [address] [address]   Phone:  [phone]  Fax:  [phone]

## 2023-07-03 NOTE — TELEPHONE ENCOUNTER
Please initiate PA for [facility]. Recommended but weight loss clinic that she take this in place of Ozempic.

## 2023-07-07 NOTE — TELEPHONE ENCOUNTER
Submitted PA for [employer]  Via [employer] Key: [id number] STATUS: PENDING. Follow up done daily; if no response in three days we will refax for status check. If another three days goes by with no response we will call the insurance for status.

## 2023-08-10 ENCOUNTER — TELEPHONE (OUTPATIENT)
Dept: ENDOCRINOLOGY | Age: 68
End: 2023-08-10

## 2023-08-17 DIAGNOSIS — Z79.4 TYPE 2 DIABETES MELLITUS WITHOUT COMPLICATION, WITH LONG-TERM CURRENT USE OF INSULIN (HCC): ICD-10-CM

## 2023-08-17 DIAGNOSIS — E11.9 TYPE 2 DIABETES MELLITUS WITHOUT COMPLICATION, WITH LONG-TERM CURRENT USE OF INSULIN (HCC): ICD-10-CM

## 2023-08-18 RX ORDER — TIRZEPATIDE 2.5 MG/.5ML
INJECTION, SOLUTION SUBCUTANEOUS
Qty: 2 ML | Refills: 0 | OUTPATIENT
Start: 2023-08-18

## 2023-08-21 ENCOUNTER — TELEPHONE (OUTPATIENT)
Dept: ENDOCRINOLOGY | Age: 68
End: 2023-08-21

## 2023-08-21 NOTE — TELEPHONE ENCOUNTER
Pt called in wanting to know why rx for mounjaro was declined. There was not a reason listed but I said because there is no appt scheduled. Earliest I see was January and pt said she needs to get in sooner and requested a call back from [facility].

## 2023-08-22 ENCOUNTER — TELEPHONE (OUTPATIENT)
Dept: ENDOCRINOLOGY | Age: 68
End: 2023-08-22

## 2023-08-22 LAB
ALBUMIN SERPL-MCNC: 3.9 G/DL (ref 3.5–5.7)
ALP BLD-CCNC: 92 U/L (ref 36–125)
ALT SERPL-CCNC: 8 U/L (ref 7–52)
ANION GAP SERPL CALCULATED.3IONS-SCNC: 6 MMOL/L (ref 3–16)
AST SERPL-CCNC: 14 U/L (ref 13–39)
BILIRUB SERPL-MCNC: 0.2 MG/DL (ref 0–1.5)
BUN BLDV-MCNC: 29 MG/DL (ref 7–25)
CALCIUM SERPL-MCNC: 9.2 MG/DL (ref 8.6–10.3)
CHLORIDE BLD-SCNC: 107 MMOL/L (ref 98–110)
CHOLESTEROL, TOTAL: 155 MG/DL (ref 0–200)
CO2: 28 MMOL/L (ref 21–33)
CREAT SERPL-MCNC: 1.89 MG/DL (ref 0.6–1.3)
ESTIMATED GLOMERULAR FILTRATION RATE CREATININE EQUATION: 29
GLUCOSE BLD-MCNC: 121 MG/DL (ref 70–100)
HBA1C MFR BLD: 8.4 % (ref 4–5.6)
HDLC SERPL-MCNC: 28 MG/DL (ref 60–92)
LDL CHOLESTEROL CALCULATED: 76 MG/DL
NON-HDL CHOLESTEROL: 127 MG/DL (ref 0–129)
OSMOLALITY CALCULATION: 299 MOSM/KG (ref 278–305)
POTASSIUM SERPL-SCNC: 6.3 MMOL/L (ref 3.5–5.3)
SODIUM BLD-SCNC: 141 MMOL/L (ref 133–146)
TOTAL PROTEIN: 6.1 G/DL (ref 6.4–8.9)
TRIGL SERPL-MCNC: 256 MG/DL (ref 10–149)
TSH, 3RD GENERATION: 3.39 UIU/ML (ref 0.45–4.12)

## 2023-08-22 NOTE — TELEPHONE ENCOUNTER
Call from [phone] [facility] w/ a critical lab result on Potassium = 6.3 taken @ 1037am 8/22/23    CB# for Pati @ [phone] [facility] lab [phone]

## 2023-08-23 NOTE — TELEPHONE ENCOUNTER
Patient is upset that she did not get a phone call yesterday. She would like to speak to Dr Stephon Valdovinos.

## 2023-08-23 NOTE — TELEPHONE ENCOUNTER
LMOM for patient to contact us about a critical lab result. Per her HIPAA we cannot call anyone else.

## 2023-08-23 NOTE — TELEPHONE ENCOUNTER
Called patient and she told me that she has noted weakness other than her shortness of breath that she had for some time. She is already called her nephrologist and was advised to go to the ER  She will let me know how she is doing.

## 2023-08-24 ENCOUNTER — TELEPHONE (OUTPATIENT)
Dept: ENDOCRINOLOGY | Age: 68
End: 2023-08-24

## 2023-08-24 DIAGNOSIS — Z79.4 TYPE 2 DIABETES MELLITUS WITHOUT COMPLICATION, WITH LONG-TERM CURRENT USE OF INSULIN (HCC): ICD-10-CM

## 2023-08-24 DIAGNOSIS — E11.9 TYPE 2 DIABETES MELLITUS WITHOUT COMPLICATION, WITH LONG-TERM CURRENT USE OF INSULIN (HCC): ICD-10-CM

## 2023-08-24 RX ORDER — INSULIN ASPART 100 [IU]/ML
INJECTION, SOLUTION INTRAVENOUS; SUBCUTANEOUS
Qty: 150 ML | Refills: 3 | Status: SHIPPED | OUTPATIENT
Start: 2023-08-24

## 2023-08-24 RX ORDER — BLOOD SUGAR DIAGNOSTIC
STRIP MISCELLANEOUS
Qty: 200 EACH | Refills: 5 | Status: SHIPPED | OUTPATIENT
Start: 2023-08-24

## 2023-08-24 RX ORDER — TIRZEPATIDE 5 MG/.5ML
5 INJECTION, SOLUTION SUBCUTANEOUS WEEKLY
Qty: 2 ML | Refills: 0 | Status: SHIPPED | OUTPATIENT
Start: 2023-08-24

## 2023-08-25 DIAGNOSIS — Z79.4 TYPE 2 DIABETES MELLITUS WITHOUT COMPLICATION, WITH LONG-TERM CURRENT USE OF INSULIN (HCC): ICD-10-CM

## 2023-08-25 DIAGNOSIS — E11.9 TYPE 2 DIABETES MELLITUS WITHOUT COMPLICATION, WITH LONG-TERM CURRENT USE OF INSULIN (HCC): ICD-10-CM

## 2023-08-25 RX ORDER — TIRZEPATIDE 2.5 MG/.5ML
INJECTION, SOLUTION SUBCUTANEOUS
Qty: 2 ML | Refills: 0 | OUTPATIENT
Start: 2023-08-25

## 2023-09-06 ENCOUNTER — OFFICE VISIT (OUTPATIENT)
Dept: ENDOCRINOLOGY | Age: 68
End: 2023-09-06
Payer: MEDICARE

## 2023-09-06 VITALS
SYSTOLIC BLOOD PRESSURE: 123 MMHG | TEMPERATURE: 98 F | HEIGHT: 63 IN | BODY MASS INDEX: 39.16 KG/M2 | HEART RATE: 61 BPM | WEIGHT: 221 LBS | RESPIRATION RATE: 14 BRPM | DIASTOLIC BLOOD PRESSURE: 62 MMHG

## 2023-09-06 DIAGNOSIS — Z79.4 TYPE 2 DIABETES MELLITUS WITHOUT COMPLICATION, WITH LONG-TERM CURRENT USE OF INSULIN (HCC): Primary | ICD-10-CM

## 2023-09-06 DIAGNOSIS — E66.01 CLASS 3 SEVERE OBESITY DUE TO EXCESS CALORIES WITH SERIOUS COMORBIDITY AND BODY MASS INDEX (BMI) OF 45.0 TO 49.9 IN ADULT (HCC): ICD-10-CM

## 2023-09-06 DIAGNOSIS — E11.9 TYPE 2 DIABETES MELLITUS WITHOUT COMPLICATION, WITH LONG-TERM CURRENT USE OF INSULIN (HCC): Primary | ICD-10-CM

## 2023-09-06 DIAGNOSIS — I25.10 CORONARY ARTERY DISEASE INVOLVING NATIVE CORONARY ARTERY OF NATIVE HEART WITHOUT ANGINA PECTORIS: ICD-10-CM

## 2023-09-06 PROCEDURE — 1036F TOBACCO NON-USER: CPT | Performed by: INTERNAL MEDICINE

## 2023-09-06 PROCEDURE — 3078F DIAST BP <80 MM HG: CPT | Performed by: INTERNAL MEDICINE

## 2023-09-06 PROCEDURE — G8400 PT W/DXA NO RESULTS DOC: HCPCS | Performed by: INTERNAL MEDICINE

## 2023-09-06 PROCEDURE — 99215 OFFICE O/P EST HI 40 MIN: CPT | Performed by: INTERNAL MEDICINE

## 2023-09-06 PROCEDURE — 3074F SYST BP LT 130 MM HG: CPT | Performed by: INTERNAL MEDICINE

## 2023-09-06 PROCEDURE — 3017F COLORECTAL CA SCREEN DOC REV: CPT | Performed by: INTERNAL MEDICINE

## 2023-09-06 PROCEDURE — 1123F ACP DISCUSS/DSCN MKR DOCD: CPT | Performed by: INTERNAL MEDICINE

## 2023-09-06 PROCEDURE — 1090F PRES/ABSN URINE INCON ASSESS: CPT | Performed by: INTERNAL MEDICINE

## 2023-09-06 PROCEDURE — G8427 DOCREV CUR MEDS BY ELIG CLIN: HCPCS | Performed by: INTERNAL MEDICINE

## 2023-09-06 PROCEDURE — 3052F HG A1C>EQUAL 8.0%<EQUAL 9.0%: CPT | Performed by: INTERNAL MEDICINE

## 2023-09-06 PROCEDURE — 2022F DILAT RTA XM EVC RTNOPTHY: CPT | Performed by: INTERNAL MEDICINE

## 2023-09-06 PROCEDURE — G8417 CALC BMI ABV UP PARAM F/U: HCPCS | Performed by: INTERNAL MEDICINE

## 2023-09-06 RX ORDER — BLOOD-GLUCOSE SENSOR
EACH MISCELLANEOUS
Qty: 2 EACH | Refills: 5 | Status: SHIPPED | OUTPATIENT
Start: 2023-09-06

## 2023-09-06 NOTE — PROGRESS NOTES
Erica Licona is a 80 y.o. female  seen for the management of uncontrolled Type 2 diabetes,hyperlipidemia and obesity. Pt has T2DM which is uncontrolled and is complicated by severe peripheral neuropathy, retinopathy and nephropathy. Pt was diagnosed in year 1995 when she was being evaluated for hypertriglyceridemia GEORGE has been very noncompliant she admits that her depression contributes to her noncompliance. Delphine Jacobs has been obese most of her adult life  and she doesn't follow any dietry restriction. -- she has CAD s/p  stents 2016  And has CHF as well follows with Dr Nenita Ibrahim     Her fingerstick blood glucose  improved when  compliance improved in oct/nov 2017 but she stopped taking insulin regularly again and her A1c worsened  Finally she has started taking insulin more often and started checking her numbers and some of her fingerstick blood glucose were in the 100 range but she went back to her noncompliance after a few months of better control. ---she was hospitalized for CHF worsening and renal insufficiency in jan 2020 and then again in august 2020   ---She had gastric sleeve in Oct 2020 lost 50 lbs in total and then regained most of it back now scheduled to undergo gastric bypass surgery on September 14, 2020      INTERIM:    Diabetes  She presents for her follow-up diabetic visit. She has type 2 diabetes mellitus. No MedicAlert identification noted. The initial diagnosis of diabetes was made 23 years ago. Her disease course has been stable. Hypoglycemia symptoms include confusion, nervousness/anxiousness, sweats and tremors. Associated symptoms include weakness and weight loss. Pertinent negatives for diabetes include no chest pain and no fatigue. There are no hypoglycemic complications. Symptoms are stable. Diabetic complications include a CVA, heart disease, peripheral neuropathy and retinopathy.  Risk factors for coronary artery disease include diabetes mellitus, dyslipidemia, family history and

## 2023-09-11 ENCOUNTER — TELEPHONE (OUTPATIENT)
Dept: ENDOCRINOLOGY | Age: 68
End: 2023-09-11

## 2023-09-11 NOTE — TELEPHONE ENCOUNTER
Pt called in needing to cancel November appt.  When told next available was february she requested a call back from someone in the back to get her in sooner

## 2023-09-13 DIAGNOSIS — E11.9 TYPE 2 DIABETES MELLITUS WITHOUT COMPLICATION, WITH LONG-TERM CURRENT USE OF INSULIN (HCC): Primary | ICD-10-CM

## 2023-09-13 DIAGNOSIS — Z79.4 TYPE 2 DIABETES MELLITUS WITHOUT COMPLICATION, WITH LONG-TERM CURRENT USE OF INSULIN (HCC): Primary | ICD-10-CM

## 2023-09-13 RX ORDER — BLOOD-GLUCOSE SENSOR
EACH MISCELLANEOUS
Qty: 2 EACH | Refills: 5 | Status: SHIPPED | OUTPATIENT
Start: 2023-09-13

## 2023-10-09 ENCOUNTER — TELEPHONE (OUTPATIENT)
Dept: ENDOCRINOLOGY | Age: 68
End: 2023-10-09

## 2023-11-13 LAB
ALBUMIN SERPL-MCNC: 3.6 G/DL (ref 3.5–5.7)
ALP BLD-CCNC: 99 U/L (ref 36–125)
ALT SERPL-CCNC: 11 U/L (ref 7–52)
ANION GAP SERPL CALCULATED.3IONS-SCNC: 12 MMOL/L (ref 3–16)
AST SERPL-CCNC: 14 U/L (ref 13–39)
BILIRUB SERPL-MCNC: 0.4 MG/DL (ref 0–1.5)
BUN BLDV-MCNC: 46 MG/DL (ref 7–25)
CALCIUM SERPL-MCNC: 9.2 MG/DL (ref 8.6–10.3)
CHLORIDE BLD-SCNC: 103 MMOL/L (ref 98–110)
CHOLESTEROL, TOTAL: 123 MG/DL (ref 0–200)
CO2: 27 MMOL/L (ref 21–33)
CREAT SERPL-MCNC: 1.5 MG/DL (ref 0.6–1.3)
CREATININE URINE: 74.8 MG/DL
ESTIMATED GLOMERULAR FILTRATION RATE CREATININE EQUATION: 38
GLUCOSE BLD-MCNC: 173 MG/DL (ref 70–100)
HBA1C MFR BLD: 7.2 % (ref 4–5.6)
HDLC SERPL-MCNC: 35 MG/DL (ref 60–92)
LDL CHOLESTEROL CALCULATED: 38 MG/DL
MICROALBUMIN UR-MCNC: 162.9 MG/L (ref 0–17)
MICROALBUMIN/CREAT UR-RTO: 217.8 MG/G (ref 0–30)
NON-HDL CHOLESTEROL: 88 MG/DL (ref 0–129)
OSMOLALITY CALCULATION: 310 MOSM/KG (ref 278–305)
POTASSIUM SERPL-SCNC: 4.5 MMOL/L (ref 3.5–5.3)
SODIUM BLD-SCNC: 142 MMOL/L (ref 133–146)
TOTAL PROTEIN: 6.5 G/DL (ref 6.4–8.9)
TRIGL SERPL-MCNC: 250 MG/DL (ref 10–149)
TSH, 3RD GENERATION: 1.17 UIU/ML (ref 0.45–4.12)

## 2023-11-14 ENCOUNTER — TELEPHONE (OUTPATIENT)
Dept: ENDOCRINOLOGY | Age: 68
End: 2023-11-14

## 2023-11-14 ENCOUNTER — OFFICE VISIT (OUTPATIENT)
Dept: ENDOCRINOLOGY | Age: 68
End: 2023-11-14
Payer: MEDICARE

## 2023-11-14 VITALS
HEIGHT: 63 IN | RESPIRATION RATE: 14 BRPM | HEART RATE: 63 BPM | DIASTOLIC BLOOD PRESSURE: 66 MMHG | SYSTOLIC BLOOD PRESSURE: 134 MMHG | BODY MASS INDEX: 35.26 KG/M2 | WEIGHT: 199 LBS | TEMPERATURE: 98 F

## 2023-11-14 DIAGNOSIS — E11.9 TYPE 2 DIABETES MELLITUS WITHOUT COMPLICATION, WITH LONG-TERM CURRENT USE OF INSULIN (HCC): Primary | ICD-10-CM

## 2023-11-14 DIAGNOSIS — I25.10 CORONARY ARTERY DISEASE INVOLVING NATIVE CORONARY ARTERY OF NATIVE HEART WITHOUT ANGINA PECTORIS: ICD-10-CM

## 2023-11-14 DIAGNOSIS — Z79.4 TYPE 2 DIABETES MELLITUS WITHOUT COMPLICATION, WITH LONG-TERM CURRENT USE OF INSULIN (HCC): Primary | ICD-10-CM

## 2023-11-14 DIAGNOSIS — E78.2 MIXED HYPERLIPIDEMIA: ICD-10-CM

## 2023-11-14 DIAGNOSIS — I10 ESSENTIAL HYPERTENSION: ICD-10-CM

## 2023-11-14 PROCEDURE — G8484 FLU IMMUNIZE NO ADMIN: HCPCS | Performed by: INTERNAL MEDICINE

## 2023-11-14 PROCEDURE — 2022F DILAT RTA XM EVC RTNOPTHY: CPT | Performed by: INTERNAL MEDICINE

## 2023-11-14 PROCEDURE — G8400 PT W/DXA NO RESULTS DOC: HCPCS | Performed by: INTERNAL MEDICINE

## 2023-11-14 PROCEDURE — 3078F DIAST BP <80 MM HG: CPT | Performed by: INTERNAL MEDICINE

## 2023-11-14 PROCEDURE — 3074F SYST BP LT 130 MM HG: CPT | Performed by: INTERNAL MEDICINE

## 2023-11-14 PROCEDURE — 3051F HG A1C>EQUAL 7.0%<8.0%: CPT | Performed by: INTERNAL MEDICINE

## 2023-11-14 PROCEDURE — G8417 CALC BMI ABV UP PARAM F/U: HCPCS | Performed by: INTERNAL MEDICINE

## 2023-11-14 PROCEDURE — G8427 DOCREV CUR MEDS BY ELIG CLIN: HCPCS | Performed by: INTERNAL MEDICINE

## 2023-11-14 PROCEDURE — 3017F COLORECTAL CA SCREEN DOC REV: CPT | Performed by: INTERNAL MEDICINE

## 2023-11-14 PROCEDURE — 1036F TOBACCO NON-USER: CPT | Performed by: INTERNAL MEDICINE

## 2023-11-14 PROCEDURE — 1123F ACP DISCUSS/DSCN MKR DOCD: CPT | Performed by: INTERNAL MEDICINE

## 2023-11-14 PROCEDURE — 1090F PRES/ABSN URINE INCON ASSESS: CPT | Performed by: INTERNAL MEDICINE

## 2023-11-14 PROCEDURE — 99214 OFFICE O/P EST MOD 30 MIN: CPT | Performed by: INTERNAL MEDICINE

## 2023-11-14 RX ORDER — LINEZOLID 600 MG/1
TABLET, FILM COATED ORAL
COMMUNITY

## 2023-11-14 RX ORDER — BLOOD-GLUCOSE SENSOR
EACH MISCELLANEOUS
Qty: 2 EACH | Refills: 5 | Status: SHIPPED | OUTPATIENT
Start: 2023-11-14

## 2023-11-14 RX ORDER — TIRZEPATIDE 2.5 MG/.5ML
2.5 INJECTION, SOLUTION SUBCUTANEOUS WEEKLY
Qty: 4 EACH | Refills: 4 | Status: SHIPPED | OUTPATIENT
Start: 2023-11-14

## 2023-11-14 RX ORDER — LAMOTRIGINE 200 MG/1
TABLET ORAL
COMMUNITY
Start: 2023-09-09

## 2023-11-14 NOTE — TELEPHONE ENCOUNTER
Pt called in and said she was supposed to get samples of mounjaro and left the office without them.  She would like a script called into her pharmacy she said vs the [address] [id number] [facility], [address] [place] [address], [address] [place] [id number]  Phone: [phone]  Fax: [phone]

## 2023-11-14 NOTE — PROGRESS NOTES
erythema or discharge, pupils are normal,   Ears/Nose: external inspection of ears and nose revealed no abnormalities, hearing is grossly normal  Oropharynx/Mouth/Face: lips, tongue and gums are normal with no lesions, the voice quality was normal  Neck: neck is supple and symmetric, with midline trachea and no masses, thyroid is normal    Pulmonary: no increased work of breathing or signs of respiratory distress, lungs are clear to auscultation  Cardiovascular: normal heart rate and rhythm, normal S1 and S2,   Musculoskeletal: normal gait and station,   Neurological: normal coordination, normal general cortical function      Lab Results   Component Value Date/Time    LABA1C 7.2 11/13/2023 02:56 PM    LABA1C 8.4 08/22/2023 10:37 AM    LABA1C 10.5 05/15/2023 03:53 PM         ASSESSMENT/PLAN:      --- Poorly controlled type 2 diabetes mellitus with complication, with long-term current use of insulin ---  A1c 8.4 in August 2023>>7.2  She has been undergoing a lot of stress related to her upcoming surgery as well as after her mom passed away a year ago she is still getting used to the new routines  Stopped mounjaro in sept when she was getting her Luis Cord Mounjaro 2.5 mg weekly dose now, advised to stop Mounjaro and Jardiance before her next surgery for at least 5  days, once she comes out of surgery and is back to baseline then resume Mounjaro and will call us to uptitrate the dose if tolerated  -- she only takes novolog on Prn basis   Adequate hydration discussed with the patient in detail  ----She is taking 30  units of Levemir BID Advised to take 4 to 6 units of short acting insulin with each meal.  Will   Advised her to send glucose logs appropriately significantly made.         Gastric sleeve in Oct 2020 lost 50+ lbs weight loss so far,       Sometimes is not compliant with diet but will work on that  --Glipizide stopped in May 2019   --synjardy ( pt couldn't tolerate SE stopped it  Due to yeast infection )

## 2023-12-05 ENCOUNTER — TELEPHONE (OUTPATIENT)
Dept: ENDOCRINOLOGY | Age: 68
End: 2023-12-05

## 2023-12-10 ENCOUNTER — PATIENT MESSAGE (OUTPATIENT)
Dept: ENDOCRINOLOGY | Age: 68
End: 2023-12-10

## 2023-12-31 DIAGNOSIS — Z79.4 TYPE 2 DIABETES MELLITUS WITHOUT COMPLICATION, WITH LONG-TERM CURRENT USE OF INSULIN (HCC): ICD-10-CM

## 2023-12-31 DIAGNOSIS — E11.9 TYPE 2 DIABETES MELLITUS WITHOUT COMPLICATION, WITH LONG-TERM CURRENT USE OF INSULIN (HCC): ICD-10-CM

## 2024-01-08 ENCOUNTER — TELEPHONE (OUTPATIENT)
Dept: ENDOCRINOLOGY | Age: 69
End: 2024-01-08

## 2024-01-08 NOTE — TELEPHONE ENCOUNTER
Fax from [employer]    Pt has been provided a temporary supply of levemir    This drug is either not included on our list of cov'd drugs or its included on the formulary but has certain limits

## 2024-02-16 ENCOUNTER — TELEPHONE (OUTPATIENT)
Dept: ENDOCRINOLOGY | Age: 69
End: 2024-02-16

## 2024-02-16 DIAGNOSIS — E11.9 TYPE 2 DIABETES MELLITUS WITHOUT COMPLICATION, WITH LONG-TERM CURRENT USE OF INSULIN (HCC): Primary | ICD-10-CM

## 2024-02-16 DIAGNOSIS — Z79.4 TYPE 2 DIABETES MELLITUS WITHOUT COMPLICATION, WITH LONG-TERM CURRENT USE OF INSULIN (HCC): Primary | ICD-10-CM

## 2024-02-16 RX ORDER — BLOOD-GLUCOSE SENSOR
EACH MISCELLANEOUS
Qty: 1 EACH | Refills: 0 | Status: SHIPPED | OUTPATIENT
Start: 2024-02-16

## 2024-02-16 NOTE — TELEPHONE ENCOUNTER
Call from pt stating that she dropped her Freestyle leonarda 3 sensor and bent the needle. Pt stated that the company wont replace it because it was her fault.    Pt is wanting to know if we had any Freestyle leonarda 3 sensors available in the office     Please advise   CB# [phone]

## 2024-02-26 LAB
ALBUMIN SERPL-MCNC: 3.3 G/DL (ref 3.5–5.7)
ALP BLD-CCNC: 119 U/L (ref 36–125)
ALT SERPL-CCNC: 11 U/L (ref 7–52)
ANION GAP SERPL CALCULATED.3IONS-SCNC: 10 MMOL/L (ref 3–16)
AST SERPL-CCNC: 13 U/L (ref 13–39)
BILIRUB SERPL-MCNC: 0.4 MG/DL (ref 0–1.5)
BUN BLDV-MCNC: 26 MG/DL (ref 7–25)
CALCIUM SERPL-MCNC: 8.8 MG/DL (ref 8.6–10.3)
CHLORIDE BLD-SCNC: 108 MMOL/L (ref 98–110)
CHOLESTEROL, TOTAL: 171 MG/DL (ref 0–200)
CO2: 26 MMOL/L (ref 21–33)
CREAT SERPL-MCNC: 0.96 MG/DL (ref 0.6–1.3)
ESTIMATED GLOMERULAR FILTRATION RATE CREATININE EQUATION: 65
GLUCOSE BLD-MCNC: 146 MG/DL (ref 70–100)
HDLC SERPL-MCNC: 44 MG/DL (ref 60–92)
LDL CHOLESTEROL CALCULATED: 96 MG/DL
NON-HDL CHOLESTEROL: 127 MG/DL (ref 0–129)
OSMOLALITY CALCULATION: 305 MOSM/KG (ref 278–305)
POTASSIUM SERPL-SCNC: 4.1 MMOL/L (ref 3.5–5.3)
SODIUM BLD-SCNC: 144 MMOL/L (ref 133–146)
TOTAL PROTEIN: 5.6 G/DL (ref 6.4–8.9)
TRIGL SERPL-MCNC: 153 MG/DL (ref 10–149)
TSH, 3RD GENERATION: 2.44 UIU/ML (ref 0.45–4.12)

## 2024-02-27 LAB — HBA1C MFR BLD: 7 % (ref 4–5.6)

## 2024-02-28 ENCOUNTER — OFFICE VISIT (OUTPATIENT)
Dept: ENDOCRINOLOGY | Age: 69
End: 2024-02-28
Payer: MEDICARE

## 2024-02-28 VITALS
TEMPERATURE: 98 F | HEIGHT: 63 IN | HEART RATE: 67 BPM | WEIGHT: 190 LBS | BODY MASS INDEX: 33.66 KG/M2 | SYSTOLIC BLOOD PRESSURE: 134 MMHG | RESPIRATION RATE: 14 BRPM | DIASTOLIC BLOOD PRESSURE: 84 MMHG

## 2024-02-28 DIAGNOSIS — Z79.4 TYPE 2 DIABETES MELLITUS WITHOUT COMPLICATION, WITH LONG-TERM CURRENT USE OF INSULIN (HCC): Primary | ICD-10-CM

## 2024-02-28 DIAGNOSIS — E11.9 TYPE 2 DIABETES MELLITUS WITHOUT COMPLICATION, WITH LONG-TERM CURRENT USE OF INSULIN (HCC): Primary | ICD-10-CM

## 2024-02-28 DIAGNOSIS — I10 ESSENTIAL HYPERTENSION: ICD-10-CM

## 2024-02-28 DIAGNOSIS — E66.01 CLASS 3 SEVERE OBESITY DUE TO EXCESS CALORIES WITH SERIOUS COMORBIDITY AND BODY MASS INDEX (BMI) OF 45.0 TO 49.9 IN ADULT (HCC): ICD-10-CM

## 2024-02-28 DIAGNOSIS — E78.2 MIXED HYPERLIPIDEMIA: ICD-10-CM

## 2024-02-28 PROCEDURE — 99215 OFFICE O/P EST HI 40 MIN: CPT | Performed by: INTERNAL MEDICINE

## 2024-02-28 PROCEDURE — 3079F DIAST BP 80-89 MM HG: CPT | Performed by: INTERNAL MEDICINE

## 2024-02-28 PROCEDURE — 1123F ACP DISCUSS/DSCN MKR DOCD: CPT | Performed by: INTERNAL MEDICINE

## 2024-02-28 PROCEDURE — 95251 CONT GLUC MNTR ANALYSIS I&R: CPT | Performed by: INTERNAL MEDICINE

## 2024-02-28 PROCEDURE — 3075F SYST BP GE 130 - 139MM HG: CPT | Performed by: INTERNAL MEDICINE

## 2024-02-28 PROCEDURE — 3051F HG A1C>EQUAL 7.0%<8.0%: CPT | Performed by: INTERNAL MEDICINE

## 2024-02-28 RX ORDER — TIRZEPATIDE 5 MG/.5ML
5 INJECTION, SOLUTION SUBCUTANEOUS WEEKLY
Qty: 4 ADJUSTABLE DOSE PRE-FILLED PEN SYRINGE | Refills: 4 | Status: SHIPPED | OUTPATIENT
Start: 2024-02-28

## 2024-02-28 RX ORDER — BLOOD-GLUCOSE SENSOR
EACH MISCELLANEOUS
Qty: 6 EACH | Refills: 5 | Status: SHIPPED | OUTPATIENT
Start: 2024-02-28

## 2024-02-28 NOTE — PROGRESS NOTES
George Solorzano is a 68 y.o. female  seen for the management of uncontrolled Type 2 diabetes,hyperlipidemia and obesity.    Pt has T2DM which is uncontrolled and is complicated by severe peripheral neuropathy, retinopathy and nephropathy.  Pt was diagnosed in year 1995 when she was being evaluated for hypertriglyceridemia GEORGE has been very noncompliant she admits that her depression contributes to her noncompliance.    GEORGE has been obese most of her adult life  and she doesn't follow any dietry restriction.   -- she has CAD s/p  stents 2016  And has CHF as well follows with Dr Salvador     Her fingerstick blood glucose  improved when  compliance improved in oct/nov 2017 but she stopped taking insulin regularly again and her A1c worsened  Finally she has started taking insulin more often and started checking her numbers and some of her fingerstick blood glucose were in the 100 range but she went back to her noncompliance after a few months of better control.    ---she was hospitalized for CHF worsening and renal insufficiency in jan 2020 and then again in august 2020   ---She had gastric sleeve in Oct 2020 lost 50 lbs in total and then regained most of it back now scheduled to undergo gastric bypass surgery on September 14, 2020      INTERIM:    Diabetes  She presents for her follow-up diabetic visit. She has type 2 diabetes mellitus. No MedicAlert identification noted. The initial diagnosis of diabetes was made 23 years ago. Her disease course has been stable. Hypoglycemia symptoms include confusion, nervousness/anxiousness, sweats and tremors. Associated symptoms include weakness and weight loss. Pertinent negatives for diabetes include no chest pain and no fatigue. There are no hypoglycemic complications. Symptoms are stable. Diabetic complications include a CVA, heart disease, peripheral neuropathy and retinopathy. Risk factors for coronary artery disease include diabetes mellitus, dyslipidemia, family history and

## 2024-04-24 ENCOUNTER — PATIENT MESSAGE (OUTPATIENT)
Dept: ENDOCRINOLOGY | Age: 69
End: 2024-04-24

## 2024-04-24 DIAGNOSIS — E11.9 TYPE 2 DIABETES MELLITUS WITHOUT COMPLICATION, WITH LONG-TERM CURRENT USE OF INSULIN (HCC): Primary | ICD-10-CM

## 2024-04-24 DIAGNOSIS — Z79.4 TYPE 2 DIABETES MELLITUS WITHOUT COMPLICATION, WITH LONG-TERM CURRENT USE OF INSULIN (HCC): Primary | ICD-10-CM

## 2024-04-24 RX ORDER — TIRZEPATIDE 2.5 MG/.5ML
INJECTION, SOLUTION SUBCUTANEOUS
Qty: 2 ML | Refills: 3 | Status: SHIPPED | OUTPATIENT
Start: 2024-04-24

## 2024-04-24 NOTE — TELEPHONE ENCOUNTER
From: Lisbeth Solorzano  Sent: 4/24/2024 3:31 PM EDT  To: [facility] Clinical Staff  Subject: Vladimir     I just found out that my [employer] has the 2.5 in stock so maybe calling that in is better than not taking any! I just read on google that the shortage could last through June 2024!

## 2024-05-07 ENCOUNTER — PATIENT MESSAGE (OUTPATIENT)
Dept: ENDOCRINOLOGY | Age: 69
End: 2024-05-07

## 2024-05-07 DIAGNOSIS — Z79.4 TYPE 2 DIABETES MELLITUS WITHOUT COMPLICATION, WITH LONG-TERM CURRENT USE OF INSULIN (HCC): Primary | ICD-10-CM

## 2024-05-07 DIAGNOSIS — E11.9 TYPE 2 DIABETES MELLITUS WITHOUT COMPLICATION, WITH LONG-TERM CURRENT USE OF INSULIN (HCC): Primary | ICD-10-CM

## 2024-05-07 RX ORDER — BLOOD-GLUCOSE SENSOR
EACH MISCELLANEOUS
Qty: 6 EACH | Refills: 3 | Status: SHIPPED | OUTPATIENT
Start: 2024-05-07

## 2024-05-07 NOTE — TELEPHONE ENCOUNTER
From: Lisbeth Solorzano  To: Dr. Catie Gannon  Sent: 5/7/2024 4:16 PM EDT  Subject: Bibiana 3    I just called [facility] [employer] to get my sensors filled and was told that the office needs to get prior authorization for them to fill it. They ([facility]) aren’t a durable medical place but the company that has been filling it doesn’t notify me or automatically fill and send them out. Because of this I would like [facility] to fill them. I have [employer] for my insurance and you should have my info since I’ve been to the office while on this insurance. I just put my last sensor on this past Saturday. Just keep me informed by My Chart. Thanks Dave

## 2024-05-10 ENCOUNTER — TELEPHONE (OUTPATIENT)
Dept: ENDOCRINOLOGY | Age: 69
End: 2024-05-10

## 2024-05-10 NOTE — TELEPHONE ENCOUNTER
Submitted PA for Freestyle Bibiana Sensor  Via [email or web address] Key: [id number]  STATUS: PENDING.    Follow up done daily; if no decision with in three days we will refax.  If another three days goes by with no decision will call the insurance for status.

## 2024-05-13 NOTE — TELEPHONE ENCOUNTER
Approved on May 10  PA Case: [id number], Status: Approved, Coverage Starts on: 1/1/2024 12:00:00 AM, Coverage Ends on: 12/31/2024

## 2024-10-22 ENCOUNTER — OFFICE VISIT (OUTPATIENT)
Dept: ENDOCRINOLOGY | Age: 69
End: 2024-10-22
Payer: MEDICARE

## 2024-10-22 VITALS
HEART RATE: 67 BPM | RESPIRATION RATE: 14 BRPM | DIASTOLIC BLOOD PRESSURE: 81 MMHG | HEIGHT: 63 IN | WEIGHT: 154 LBS | BODY MASS INDEX: 27.29 KG/M2 | TEMPERATURE: 98 F | SYSTOLIC BLOOD PRESSURE: 136 MMHG

## 2024-10-22 DIAGNOSIS — E66.813 CLASS 3 SEVERE OBESITY DUE TO EXCESS CALORIES WITH SERIOUS COMORBIDITY AND BODY MASS INDEX (BMI) OF 45.0 TO 49.9 IN ADULT: ICD-10-CM

## 2024-10-22 DIAGNOSIS — Z79.4 TYPE 2 DIABETES MELLITUS WITHOUT COMPLICATION, WITH LONG-TERM CURRENT USE OF INSULIN (HCC): Primary | ICD-10-CM

## 2024-10-22 DIAGNOSIS — E78.2 MIXED HYPERLIPIDEMIA: ICD-10-CM

## 2024-10-22 DIAGNOSIS — E66.01 CLASS 3 SEVERE OBESITY DUE TO EXCESS CALORIES WITH SERIOUS COMORBIDITY AND BODY MASS INDEX (BMI) OF 45.0 TO 49.9 IN ADULT: ICD-10-CM

## 2024-10-22 DIAGNOSIS — I10 ESSENTIAL HYPERTENSION: ICD-10-CM

## 2024-10-22 DIAGNOSIS — E11.9 TYPE 2 DIABETES MELLITUS WITHOUT COMPLICATION, WITH LONG-TERM CURRENT USE OF INSULIN (HCC): Primary | ICD-10-CM

## 2024-10-22 LAB — HBA1C MFR BLD: 5.6 %

## 2024-10-22 PROCEDURE — G8400 PT W/DXA NO RESULTS DOC: HCPCS | Performed by: INTERNAL MEDICINE

## 2024-10-22 PROCEDURE — 1123F ACP DISCUSS/DSCN MKR DOCD: CPT | Performed by: INTERNAL MEDICINE

## 2024-10-22 PROCEDURE — 3017F COLORECTAL CA SCREEN DOC REV: CPT | Performed by: INTERNAL MEDICINE

## 2024-10-22 PROCEDURE — G8427 DOCREV CUR MEDS BY ELIG CLIN: HCPCS | Performed by: INTERNAL MEDICINE

## 2024-10-22 PROCEDURE — G8417 CALC BMI ABV UP PARAM F/U: HCPCS | Performed by: INTERNAL MEDICINE

## 2024-10-22 PROCEDURE — 83036 HEMOGLOBIN GLYCOSYLATED A1C: CPT | Performed by: INTERNAL MEDICINE

## 2024-10-22 PROCEDURE — G8484 FLU IMMUNIZE NO ADMIN: HCPCS | Performed by: INTERNAL MEDICINE

## 2024-10-22 PROCEDURE — 95251 CONT GLUC MNTR ANALYSIS I&R: CPT | Performed by: INTERNAL MEDICINE

## 2024-10-22 PROCEDURE — 2022F DILAT RTA XM EVC RTNOPTHY: CPT | Performed by: INTERNAL MEDICINE

## 2024-10-22 PROCEDURE — 3044F HG A1C LEVEL LT 7.0%: CPT | Performed by: INTERNAL MEDICINE

## 2024-10-22 PROCEDURE — 99214 OFFICE O/P EST MOD 30 MIN: CPT | Performed by: INTERNAL MEDICINE

## 2024-10-22 PROCEDURE — 1036F TOBACCO NON-USER: CPT | Performed by: INTERNAL MEDICINE

## 2024-10-22 PROCEDURE — 3079F DIAST BP 80-89 MM HG: CPT | Performed by: INTERNAL MEDICINE

## 2024-10-22 PROCEDURE — 3075F SYST BP GE 130 - 139MM HG: CPT | Performed by: INTERNAL MEDICINE

## 2024-10-22 PROCEDURE — 1090F PRES/ABSN URINE INCON ASSESS: CPT | Performed by: INTERNAL MEDICINE

## 2024-10-22 RX ORDER — BLOOD-GLUCOSE SENSOR
EACH MISCELLANEOUS
Qty: 2 EACH | Refills: 3 | Status: SHIPPED | OUTPATIENT
Start: 2024-10-22

## 2024-10-22 NOTE — PROGRESS NOTES
laser ---2024   -last microalb/creat ratio was 97.7>> 43>>78 in 8/2019 abnormal labs discussed in detail with the patient   has nephropathy aware of that ---she now follows with nephrologist   -foot exam Follows with podiatry ---rt toe amputated in sept 2021 closely follows with podiatry   - she has one MSC level which was not normal so I ordred 24 hr urine cortisol and 1 mg dex supression which pt never got done ---    OBESITY s/p gastric sleeve in oct 2020   Now scheduled for Gastric bypass surgery on sept 14 th 2023   Highest weight was 265 lb in 2020 she had gastric sleeve in 2020 >> gastric bypass in sept 2023 weighed 234 lbs  >> 80 lbs weight loss till oct 2024     --- Essential hypertension and congestive heart failure  Stable follows with Dr Salvador   CHF symptoms have improved since she had gastric sleeve surgery in 2020 and also was able to lose 50 pounds since then.  Patient is on torsemide and potassium chloride supplement as per CHF clinic    ---- Mixed hyperlipidemia  Hypertrig TGS level was 621>. 231 >>441>>336>>1089>>300 >>196>>256  She stopped gemfibrozil since hospital stay   Pills was too big for her to swallow  She will stay off and will monitor level    ---pravachol 40    Advised to get blood work done      CAD s/p angioplasty   Follows with cardiology   Did cardiac rehab   Heart cath in oct 2017     Vit D def level was 14.3 improved to mid 26 >>23.4   patient is not compliant with vitamin D    DEXA scan 2015 was normal   Repeat in September 2018 had normal bone mineral density    PSYCH ISSUES /BIPOLAR DISORDER    stable on medication.    MNG   largest nodule in rt lobe 9 mm ---stable last  imaging in June 2017 stable shows MNG   She was given orders for repeat imaging which were not done,       HX OF HYPOTHYROIDISM   -she is noted to be euthyroid without taking her LT4 for years       Reviewed and/or ordered clinical lab results Yes  Reviewed and/or ordered radiology tests Yes  Reviewed and/or

## 2025-01-15 ENCOUNTER — PATIENT MESSAGE (OUTPATIENT)
Dept: ENDOCRINOLOGY | Age: 70
End: 2025-01-15

## 2025-01-15 DIAGNOSIS — E11.9 TYPE 2 DIABETES MELLITUS WITHOUT COMPLICATION, WITH LONG-TERM CURRENT USE OF INSULIN (HCC): Primary | ICD-10-CM

## 2025-01-15 DIAGNOSIS — Z79.4 TYPE 2 DIABETES MELLITUS WITHOUT COMPLICATION, WITH LONG-TERM CURRENT USE OF INSULIN (HCC): Primary | ICD-10-CM

## 2025-01-15 NOTE — TELEPHONE ENCOUNTER
Catie, we discussed increasing my mounjero at our last appointment and I would like to change from the 5 to the 7.5 to see if it helps. Thanks     Pended the 7.5 mg dose.

## 2025-01-16 RX ORDER — TIRZEPATIDE 7.5 MG/.5ML
7.5 INJECTION, SOLUTION SUBCUTANEOUS WEEKLY
Qty: 2 ML | Refills: 2 | Status: SHIPPED | OUTPATIENT
Start: 2025-01-16

## 2025-04-15 ENCOUNTER — TELEPHONE (OUTPATIENT)
Dept: ENDOCRINOLOGY | Age: 70
End: 2025-04-15

## 2025-04-15 NOTE — TELEPHONE ENCOUNTER
Request Reference Number: [id number]. MOUNJARO INJ 7.5/0.5 is approved through 12/31/2025. Your patient may now fill this prescription and it will be covered.. Authorization Expiration Date: December 31, 2025.     If this requires a response please respond to the pool ( P MHCX P[facility] MEDICATION PRE-AUTH).      Thank you please advise patient.

## 2025-04-15 NOTE — TELEPHONE ENCOUNTER
Submitted PA for Mounjaro  Via [facility] Key: [id number] STATUS: PENDING.    Follow up done daily; if no decision with in three days we will refax.  If another three days goes by with no decision will call the insurance for status.

## 2025-05-07 ENCOUNTER — OFFICE VISIT (OUTPATIENT)
Dept: ENDOCRINOLOGY | Age: 70
End: 2025-05-07
Payer: MEDICARE

## 2025-05-07 VITALS
WEIGHT: 163 LBS | HEIGHT: 63 IN | SYSTOLIC BLOOD PRESSURE: 138 MMHG | DIASTOLIC BLOOD PRESSURE: 76 MMHG | HEART RATE: 76 BPM | BODY MASS INDEX: 28.88 KG/M2 | RESPIRATION RATE: 14 BRPM | TEMPERATURE: 98 F

## 2025-05-07 DIAGNOSIS — I25.10 CORONARY ARTERY DISEASE INVOLVING NATIVE CORONARY ARTERY OF NATIVE HEART WITHOUT ANGINA PECTORIS: ICD-10-CM

## 2025-05-07 DIAGNOSIS — E11.9 TYPE 2 DIABETES MELLITUS WITHOUT COMPLICATION, WITH LONG-TERM CURRENT USE OF INSULIN (HCC): Primary | ICD-10-CM

## 2025-05-07 DIAGNOSIS — I10 ESSENTIAL HYPERTENSION: ICD-10-CM

## 2025-05-07 DIAGNOSIS — E66.813 CLASS 3 SEVERE OBESITY DUE TO EXCESS CALORIES WITH SERIOUS COMORBIDITY AND BODY MASS INDEX (BMI) OF 45.0 TO 49.9 IN ADULT (HCC): ICD-10-CM

## 2025-05-07 DIAGNOSIS — E78.2 MIXED HYPERLIPIDEMIA: ICD-10-CM

## 2025-05-07 DIAGNOSIS — Z79.4 TYPE 2 DIABETES MELLITUS WITHOUT COMPLICATION, WITH LONG-TERM CURRENT USE OF INSULIN (HCC): Primary | ICD-10-CM

## 2025-05-07 LAB — HBA1C MFR BLD: 4.8 %

## 2025-05-07 PROCEDURE — G8400 PT W/DXA NO RESULTS DOC: HCPCS | Performed by: INTERNAL MEDICINE

## 2025-05-07 PROCEDURE — 3075F SYST BP GE 130 - 139MM HG: CPT | Performed by: INTERNAL MEDICINE

## 2025-05-07 PROCEDURE — 3044F HG A1C LEVEL LT 7.0%: CPT | Performed by: INTERNAL MEDICINE

## 2025-05-07 PROCEDURE — G8417 CALC BMI ABV UP PARAM F/U: HCPCS | Performed by: INTERNAL MEDICINE

## 2025-05-07 PROCEDURE — 1160F RVW MEDS BY RX/DR IN RCRD: CPT | Performed by: INTERNAL MEDICINE

## 2025-05-07 PROCEDURE — 95251 CONT GLUC MNTR ANALYSIS I&R: CPT | Performed by: INTERNAL MEDICINE

## 2025-05-07 PROCEDURE — 1036F TOBACCO NON-USER: CPT | Performed by: INTERNAL MEDICINE

## 2025-05-07 PROCEDURE — 1159F MED LIST DOCD IN RCRD: CPT | Performed by: INTERNAL MEDICINE

## 2025-05-07 PROCEDURE — 2022F DILAT RTA XM EVC RTNOPTHY: CPT | Performed by: INTERNAL MEDICINE

## 2025-05-07 PROCEDURE — 3017F COLORECTAL CA SCREEN DOC REV: CPT | Performed by: INTERNAL MEDICINE

## 2025-05-07 PROCEDURE — 83036 HEMOGLOBIN GLYCOSYLATED A1C: CPT | Performed by: INTERNAL MEDICINE

## 2025-05-07 PROCEDURE — 1123F ACP DISCUSS/DSCN MKR DOCD: CPT | Performed by: INTERNAL MEDICINE

## 2025-05-07 PROCEDURE — 1090F PRES/ABSN URINE INCON ASSESS: CPT | Performed by: INTERNAL MEDICINE

## 2025-05-07 PROCEDURE — 99214 OFFICE O/P EST MOD 30 MIN: CPT | Performed by: INTERNAL MEDICINE

## 2025-05-07 PROCEDURE — G8427 DOCREV CUR MEDS BY ELIG CLIN: HCPCS | Performed by: INTERNAL MEDICINE

## 2025-05-07 PROCEDURE — 3078F DIAST BP <80 MM HG: CPT | Performed by: INTERNAL MEDICINE

## 2025-05-07 RX ORDER — HYDROCHLOROTHIAZIDE 12.5 MG/1
CAPSULE ORAL
Qty: 2 EACH | Refills: 3 | Status: SHIPPED | OUTPATIENT
Start: 2025-05-07

## 2025-05-07 RX ORDER — TIRZEPATIDE 7.5 MG/.5ML
7.5 INJECTION, SOLUTION SUBCUTANEOUS WEEKLY
Qty: 2 ML | Refills: 2 | Status: SHIPPED | OUTPATIENT
Start: 2025-05-07

## 2025-05-07 NOTE — PROGRESS NOTES
George Solorzano is a 69 y.o. female  seen for the management of uncontrolled Type 2 diabetes,hyperlipidemia and obesity.    Pt has T2DM which is uncontrolled and is complicated by severe peripheral neuropathy, retinopathy and nephropathy.  Pt was diagnosed in year 1995 when she was being evaluated for hypertriglyceridemia GEORGE has been very noncompliant she admits that her depression contributes to her noncompliance.    GEORGE has been obese most of her adult life  and she doesn't follow any dietry restriction.   -- she has CAD s/p  stents 2016  And has CHF as well follows with Dr Salvadro     Her fingerstick blood glucose  improved when  compliance improved in oct/nov 2017 but she stopped taking insulin regularly again and her A1c worsened  Finally she has started taking insulin more often and started checking her numbers and some of her fingerstick blood glucose were in the 100 range but she went back to her noncompliance after a few months of better control.    ---she was hospitalized for CHF worsening and renal insufficiency in jan 2020 and then again in august 2020   ---She had gastric sleeve in Oct 2020 lost 50 lbs in total and then regained most of it back now scheduled to undergo gastric bypass surgery on September 14, 2020  She lost 66  lbs since sept 2023 after she underwent gastric bypass weighed 154 lbs in oct 2024 .  She ended up with close angle glaucoma s/p surgery in dec 2023 ar [facility]     INTERIM:    Had a fall in feb 2025 no fracture but was diagnosed with blood clots and blisters on her heels   Now follows with podiatry and wound clinic   Has not used insulin since January 2024  Gained 10 lbs since oct 2024 ( she feels mostly due to lymphedema   She has been taking Mounjaro 5 mg weekly   She denies any hypoglycemia     Past Medical History:   Diagnosis Date    Abdominal pain, right upper quadrant [id number]    Abnormal weight gain 5/3/2010    Bell's palsy [id number]    Benign intracranial hypertension 5/3/2010

## 2025-05-07 NOTE — PATIENT INSTRUCTIONS
Patient Education        Hypoglycemia: Care Instructions  Overview  Hypoglycemia means that your blood sugar is low and your body isn’t getting enough fuel. Low blood sugar can be caused by too much insulin or certain medicines. Some people get low blood sugar from missing a meal or exercising too hard without eating enough food.    Know your signs of low blood sugar. They're different for everyone. Common early signs include nausea; hunger; and feeling nervous, irritable, or shaky.   It can help to check your blood sugar levels often. Take your insulin or other medicine as prescribed.   How can you care for yourself?   Use the \"rule of 15\" to treat low blood sugar.  Eat 15 grams of carbohydrate from a quick-sugar food (such as 3 or 4 glucose tablets or 1/2 cup of juice). Wait 15 minutes and check your blood sugar. Repeat if your blood sugar is still below 70 mg/dL.    Eat after your blood sugar is in a safe range.  A snack or meal can reduce symptoms and prevent low blood sugar from coming back.    Tell friends, family, and coworkers how they can help you.  Make sure that they know the symptoms of low blood sugar and how to help you get your sugar levels up.    If you have glucagon, keep it with you.  Make sure that your friends, family, and coworkers know how to use it.   When should you call for help?    Call 911 anytime you think you may need emergency care. For example, call if:  You passed out (lost consciousness).  You are confused or cannot think clearly.  Your blood sugar is very high or very low.  Watch closely for changes in your health, and be sure to contact your doctor if:  Your blood sugar stays outside the level your doctor set for you.  You have any problems.  Where can you learn more?  Go to https://www.[employer].net/patientEd and enter R955 to learn more about \"Hypoglycemia: Care Instructions.\"  Current as of: April 30, 2024  Content Version: 14.4  © 2722-8386 [facility] [employer].   Care

## 2025-05-14 LAB
ALBUMIN: 2.5 G/DL (ref 3.5–5.7)
ALP BLD-CCNC: 106 U/L (ref 36–125)
ALT SERPL-CCNC: 27 U/L (ref 7–52)
ANION GAP SERPL CALCULATED.3IONS-SCNC: 7 MMOL/L (ref 3–16)
AST SERPL-CCNC: 31 U/L (ref 13–39)
BILIRUB SERPL-MCNC: 0.4 MG/DL (ref 0–1.5)
BUN BLDV-MCNC: 22 MG/DL (ref 7–25)
CALCIUM SERPL-MCNC: 7.8 MG/DL (ref 8.6–10.3)
CHLORIDE BLD-SCNC: 110 MMOL/L (ref 98–110)
CHOLESTEROL, TOTAL: 94 MG/DL (ref 0–200)
CO2: 28 MMOL/L (ref 21–33)
CREAT SERPL-MCNC: 0.87 MG/DL (ref 0.6–1.3)
ESTIMATED GLOMERULAR FILTRATION RATE CREATININE EQUATION: 73
GLUCOSE BLD-MCNC: 107 MG/DL (ref 70–100)
HBA1C MFR BLD: 4.9 % (ref 4–5.6)
HDLC SERPL-MCNC: 51 MG/DL (ref 60–92)
LDL CHOLESTEROL: 27 MG/DL
NON-HDL CHOLESTEROL: 43 MG/DL (ref 0–129)
OSMOLALITY CALCULATION: 304 MOSM/KG (ref 278–305)
POTASSIUM SERPL-SCNC: 4.3 MMOL/L (ref 3.5–5.3)
SODIUM BLD-SCNC: 145 MMOL/L (ref 133–146)
TOTAL PROTEIN: 4.4 G/DL (ref 6.4–8.9)
TRIGL SERPL-MCNC: 82 MG/DL (ref 10–149)
TSH ULTRASENSITIVE: 3.35 UIU/ML (ref 0.45–4.12)

## 2025-05-22 ENCOUNTER — TELEPHONE (OUTPATIENT)
Dept: ENDOCRINOLOGY | Age: 70
End: 2025-05-22

## 2025-05-22 NOTE — TELEPHONE ENCOUNTER
Letter in mail from [facility]     Pt will be provided a temp supply of Freestyle Bibiana 3 Kit plus/sensor

## 2025-05-22 NOTE — TELEPHONE ENCOUNTER
LVM message given verbatim.    Letter in mail from [facility]      Pt will be provided a temp supply of Freestyle Bibiana 3 Kit plus/sensor and to contact insurance to see if another alterative is covered.

## 2025-06-09 ENCOUNTER — TELEPHONE (OUTPATIENT)
Dept: ENDOCRINOLOGY | Age: 70
End: 2025-06-09

## 2025-07-08 ENCOUNTER — PATIENT MESSAGE (OUTPATIENT)
Dept: ENDOCRINOLOGY | Age: 70
End: 2025-07-08

## 2025-07-08 DIAGNOSIS — Z79.4 TYPE 2 DIABETES MELLITUS WITHOUT COMPLICATION, WITH LONG-TERM CURRENT USE OF INSULIN (HCC): ICD-10-CM

## 2025-07-08 DIAGNOSIS — E11.9 TYPE 2 DIABETES MELLITUS WITHOUT COMPLICATION, WITH LONG-TERM CURRENT USE OF INSULIN (HCC): ICD-10-CM

## 2025-07-09 RX ORDER — HYDROCHLOROTHIAZIDE 12.5 MG/1
CAPSULE ORAL
Qty: 6 EACH | Refills: 1 | Status: SHIPPED | OUTPATIENT
Start: 2025-07-09

## 2025-08-01 DIAGNOSIS — Z79.4 TYPE 2 DIABETES MELLITUS WITHOUT COMPLICATION, WITH LONG-TERM CURRENT USE OF INSULIN (HCC): ICD-10-CM

## 2025-08-01 DIAGNOSIS — E11.9 TYPE 2 DIABETES MELLITUS WITHOUT COMPLICATION, WITH LONG-TERM CURRENT USE OF INSULIN (HCC): ICD-10-CM

## 2025-08-04 RX ORDER — TIRZEPATIDE 7.5 MG/.5ML
INJECTION, SOLUTION SUBCUTANEOUS
Qty: 2 ML | Refills: 2 | Status: SHIPPED | OUTPATIENT
Start: 2025-08-04